# Patient Record
Sex: FEMALE | Race: WHITE | HISPANIC OR LATINO | Employment: STUDENT | URBAN - METROPOLITAN AREA
[De-identification: names, ages, dates, MRNs, and addresses within clinical notes are randomized per-mention and may not be internally consistent; named-entity substitution may affect disease eponyms.]

---
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
## 2022-01-21 DIAGNOSIS — F41.9 ANXIETY AND DEPRESSION: ICD-10-CM

## 2022-01-21 DIAGNOSIS — Z30.011 OCP (ORAL CONTRACEPTIVE PILLS) INITIATION: ICD-10-CM

## 2022-01-21 DIAGNOSIS — F32.A ANXIETY AND DEPRESSION: ICD-10-CM

## 2022-01-21 NOTE — TELEPHONE ENCOUNTER
----- Message from Krystina Oliveros sent at 1/21/2022 11:44 AM CST -----  Regarding: refill  Contact: STEVAN SUAZO [[id number]]  Patient stated she switch to Dr. Garcia. Patient requesting a refill on drospirenone-ethinyl estradioL (KRISTEL) 3-0.03 mg per tablet, and DULoxetine (CYMBALTA) 60 MG capsule.    [phone]    Please call patient at [phone].     Thanks!

## 2022-01-21 NOTE — TELEPHONE ENCOUNTER
----- Message from Krystina Oliveros sent at 1/21/2022 11:44 AM CST -----  Regarding: refill  Contact: STEVAN SUAZO [[id number]]  Patient stated she switch to Dr. Garcia. Patient requesting a refill on drospirenone-ethinyl estradioL (KRISTEL) 3-0.03 mg per tablet, and DULoxetine (CYMBALTA) 60 MG capsule.    [phone]    Please call patient at [phone].     Thanks!

## 2022-01-23 RX ORDER — DULOXETIN HYDROCHLORIDE 60 MG/1
60 CAPSULE, DELAYED RELEASE ORAL DAILY
Qty: 30 CAPSULE | Refills: 5 | Status: SHIPPED | OUTPATIENT
Start: 2022-01-23 | End: 2022-03-30

## 2022-01-23 RX ORDER — DROSPIRENONE AND ETHINYL ESTRADIOL 0.03MG-3MG
1 KIT ORAL DAILY
Qty: 30 TABLET | Refills: 5 | Status: SHIPPED | OUTPATIENT
Start: 2022-01-23 | End: 2022-01-26 | Stop reason: SDUPTHER

## 2022-01-26 ENCOUNTER — OFFICE VISIT (OUTPATIENT)
Dept: FAMILY MEDICINE | Facility: CLINIC | Age: 22
End: 2022-01-26
Payer: OTHER GOVERNMENT

## 2022-01-26 DIAGNOSIS — F32.A ANXIETY AND DEPRESSION: Primary | ICD-10-CM

## 2022-01-26 DIAGNOSIS — Z30.011 OCP (ORAL CONTRACEPTIVE PILLS) INITIATION: ICD-10-CM

## 2022-01-26 DIAGNOSIS — F41.9 ANXIETY AND DEPRESSION: Primary | ICD-10-CM

## 2022-01-26 PROCEDURE — 99214 OFFICE O/P EST MOD 30 MIN: CPT | Mod: 95,,, | Performed by: FAMILY MEDICINE

## 2022-01-26 PROCEDURE — 99214 PR OFFICE/OUTPT VISIT, EST, LEVL IV, 30-39 MIN: ICD-10-PCS | Mod: 95,,, | Performed by: FAMILY MEDICINE

## 2022-01-26 RX ORDER — BUPROPION HYDROCHLORIDE 150 MG/1
150 TABLET ORAL DAILY
Qty: 30 TABLET | Refills: 3 | Status: SHIPPED | OUTPATIENT
Start: 2022-01-26 | End: 2022-02-24

## 2022-01-26 RX ORDER — DROSPIRENONE AND ETHINYL ESTRADIOL 0.03MG-3MG
1 KIT ORAL DAILY
Qty: 30 TABLET | Refills: 5 | Status: SHIPPED | OUTPATIENT
Start: 2022-01-26 | End: 2022-07-21 | Stop reason: SDUPTHER

## 2022-01-26 NOTE — PROGRESS NOTES
[facility] - Clinic Note    Subjective    The patient location is: [facility]  The chief complaint leading to consultation is: refill on birth control    Visit type: audiovisual    Face to Face time with patient: 10  minutes of total time spent on the encounter, which includes face to face time and non-face to face time preparing to see the patient (eg, review of tests), Obtaining and/or reviewing separately obtained history, Documenting clinical information in the electronic or other health record, Independently interpreting results (not separately reported) and communicating results to the patient/family/caregiver, or Care coordination (not separately reported).         Each patient to whom he or she provides medical services by telemedicine is:  (1) informed of the relationship between the physician and patient and the respective role of any other health care provider with respect to management of the patient; and (2) notified that he or she may decline to receive medical services by telemedicine and may withdraw from such care at any time.    Notes:         Ms. Raquel Chino is a 21 y.o. female who presents for a VV.     Needs refill on OCPs.   Compliant.     Had a history of anxiety and depression.   currently on cymbalta. Has not noticed much improvement     [facility] Annmarie has a past medical history of Abnormal eye movements (07/11/2016), COVID-19, Headache, Lazy eye (07/11/2016), Orbital cellulitis on left (07/11/2016), Viral encephalitis, and Viral encephalitis.   [facility] Annmarie has no past surgical history on file.    Annmarie's family history includes Breast cancer in her maternal grandmother; Cancer in her mother; Diabetes in her paternal grandfather and paternal grandmother; Hyperlipidemia in her father; Hypertension in her father.    Annmarie reports that she has never smoked. She has never used smokeless tobacco. She reports that she does not drink alcohol and does not use drugs.   ALG  Annmarie has No Known Allergies.   DEBBIE Anguiano has a current medication list which includes the following prescription(s): bupropion, drospirenone-ethinyl estradiol, duloxetine, and linaclotide.     Review of Systems   Constitutional: Negative for activity change and unexpected weight change.   HENT: Negative for hearing loss and trouble swallowing.    Eyes: Negative for discharge and visual disturbance.   Respiratory: Negative for chest tightness and wheezing.    Cardiovascular: Negative for chest pain and palpitations.   Gastrointestinal: Negative for blood in stool, constipation, diarrhea and vomiting.   Endocrine: Negative for polydipsia and polyuria.   Genitourinary: Negative for difficulty urinating, dysuria, hematuria and menstrual problem.   Musculoskeletal: Negative for arthralgias, joint swelling and neck pain.   Neurological: Negative for weakness and headaches.   Psychiatric/Behavioral: Negative for confusion and dysphoric mood.     Objective     There were no vitals taken for this visit.    Physical Exam   Constitutional:  Non-toxic appearance. She does not appear ill. No distress.   HENT:   Head: Normocephalic and atraumatic.   Right Ear: External ear normal.   Left Ear: External ear normal.   Eyes: Right eye exhibits no discharge. Left eye exhibits no discharge.   Pulmonary/Chest: Effort normal. No respiratory distress.   Speaks in complete sentences without notable SOB. No tachypnea.    Abdominal: Normal appearance.   Neurological: She is alert.   Skin: She is not diaphoretic.   Psychiatric: Her behavior is normal. Mood, judgment and thought content normal.      Assessment/Plan     Diagnoses and all orders for this visit:  -New patient and new problem to me    Anxiety and depression  -     buPROPion (WELLBUTRIN XL) 150 MG TB24 tablet; Take 1 tablet (150 mg total) by mouth once daily.    OCP (oral contraceptive pills) initiation  -     drospirenone-ethinyl estradioL (KRISTEL) 3-0.03 mg per tablet;  Take 1 tablet by mouth once daily.    Follow up in 6 weeks.    Afua Garcia MD  Family Medicine  [facility]

## 2022-02-09 DIAGNOSIS — Z11.59 NEED FOR HEPATITIS C SCREENING TEST: ICD-10-CM

## 2022-03-15 ENCOUNTER — PATIENT MESSAGE (OUTPATIENT)
Dept: FAMILY MEDICINE | Facility: CLINIC | Age: 22
End: 2022-03-15
Payer: OTHER GOVERNMENT

## 2022-03-16 ENCOUNTER — PATIENT MESSAGE (OUTPATIENT)
Dept: ADMINISTRATIVE | Facility: HOSPITAL | Age: 22
End: 2022-03-16
Payer: OTHER GOVERNMENT

## 2022-03-16 ENCOUNTER — PATIENT OUTREACH (OUTPATIENT)
Dept: ADMINISTRATIVE | Facility: HOSPITAL | Age: 22
End: 2022-03-16
Payer: OTHER GOVERNMENT

## 2022-03-30 ENCOUNTER — PATIENT MESSAGE (OUTPATIENT)
Dept: FAMILY MEDICINE | Facility: CLINIC | Age: 22
End: 2022-03-30
Payer: OTHER GOVERNMENT

## 2022-03-30 ENCOUNTER — OFFICE VISIT (OUTPATIENT)
Dept: FAMILY MEDICINE | Facility: CLINIC | Age: 22
End: 2022-03-30
Payer: OTHER GOVERNMENT

## 2022-03-30 DIAGNOSIS — F32.A ANXIETY AND DEPRESSION: Primary | ICD-10-CM

## 2022-03-30 DIAGNOSIS — F41.9 ANXIETY AND DEPRESSION: Primary | ICD-10-CM

## 2022-03-30 DIAGNOSIS — N64.59 BREAST COMPLAINT: Primary | ICD-10-CM

## 2022-03-30 PROCEDURE — 99214 PR OFFICE/OUTPT VISIT, EST, LEVL IV, 30-39 MIN: ICD-10-PCS | Mod: 95,,, | Performed by: FAMILY MEDICINE

## 2022-03-30 PROCEDURE — 99214 OFFICE O/P EST MOD 30 MIN: CPT | Mod: 95,,, | Performed by: FAMILY MEDICINE

## 2022-03-30 RX ORDER — DESVENLAFAXINE SUCCINATE 50 MG/1
50 TABLET, EXTENDED RELEASE ORAL DAILY
Qty: 30 TABLET | Refills: 11 | Status: SHIPPED | OUTPATIENT
Start: 2022-03-30 | End: 2022-06-02

## 2022-03-30 NOTE — PROGRESS NOTES
"[facility] - Clinic Note    Subjective    The patient location is: home  The chief complaint leading to consultation is: follow up     Visit type: audiovisual    Face to Face time with patient: 15 minutes of total time spent on the encounter, which includes face to face time and non-face to face time preparing to see the patient (eg, review of tests), Obtaining and/or reviewing separately obtained history, Documenting clinical information in the electronic or other health record, Independently interpreting results (not separately reported) and communicating results to the patient/family/caregiver, or Care coordination (not separately reported).         Each patient to whom he or she provides medical services by telemedicine is:  (1) informed of the relationship between the physician and patient and the respective role of any other health care provider with respect to management of the patient; and (2) notified that he or she may decline to receive medical services by telemedicine and may withdraw from such care at any time.    Notes:    Ms. Raquel Chino is a 22 y.o. female who presents for a follow up.     Patient seen 2 months ago for anxiety and depression.   She was weaned off cymbalta at that time and started wellbutrin.   She reports that she has not noticed an improvement  Increase anxiety and stress due to school.  She is in college and having a hard time staying focused.     She would like a referral to plastic for breast evaluation. She was told prior that her has extra tissue on one breast than the other as well has a "Tubular nipple"      [facility] Annmarie has a past medical history of Abnormal eye movements (07/11/2016), COVID-19, Headache, Lazy eye (07/11/2016), Orbital cellulitis on left (07/11/2016), Viral encephalitis, and Viral encephalitis.   [facility] Annmarie has no past surgical history on file.    Annmarie's family history includes Breast cancer in her maternal grandmother; Cancer in her " mother; Diabetes in her paternal grandfather and paternal grandmother; Hyperlipidemia in her father; Hypertension in her father.   ERNESTO Anguiano reports that she has never smoked. She has never used smokeless tobacco. She reports that she does not drink alcohol and does not use drugs.   MARK Anguiano has No Known Allergies.   DEBBIE Anguiano has a current medication list which includes the following prescription(s): bupropion, desvenlafaxine succinate, and drospirenone-ethinyl estradiol.     Review of Systems   Constitutional: Negative for activity change and unexpected weight change.   HENT: Negative for hearing loss, rhinorrhea and trouble swallowing.    Eyes: Negative for discharge and visual disturbance.   Respiratory: Negative for chest tightness and wheezing.    Cardiovascular: Negative for chest pain and palpitations.   Gastrointestinal: Negative for blood in stool, constipation, diarrhea and vomiting.   Endocrine: Negative for polydipsia and polyuria.   Genitourinary: Negative for difficulty urinating, dysuria, hematuria and menstrual problem.   Musculoskeletal: Negative for arthralgias, joint swelling and neck pain.   Neurological: Positive for headaches. Negative for weakness.   Psychiatric/Behavioral: Positive for dysphoric mood. Negative for confusion.     Objective     There were no vitals taken for this visit.    Physical Exam   Constitutional:  Non-toxic appearance. She does not appear ill. No distress.   HENT:   Head: Normocephalic and atraumatic.   Right Ear: External ear normal.   Left Ear: External ear normal.   Eyes: Right eye exhibits no discharge. Left eye exhibits no discharge.   Pulmonary/Chest: Effort normal. No respiratory distress.   Speaks in complete sentences without notable SOB. No tachypnea.    Abdominal: Normal appearance.   Neurological: She is alert.   Skin: She is not diaphoretic.   Psychiatric: Her behavior is normal. Mood and thought content normal.      Assessment/Plan      Diagnoses and all orders for this visit:    Anxiety and depression  -     desvenlafaxine succinate (PRISTIQ) 50 MG Tb24; Take 1 tablet (50 mg total) by mouth once daily.    -informed patient to see who is covered under  for plastics and let me know and I will placed the referral accordingly.    Follow up in about 4 weeks (around 4/27/2022).    Afua Garcia MD  Family Medicine  [facility]

## 2022-04-06 ENCOUNTER — PATIENT MESSAGE (OUTPATIENT)
Dept: FAMILY MEDICINE | Facility: CLINIC | Age: 22
End: 2022-04-06
Payer: OTHER GOVERNMENT

## 2022-05-04 ENCOUNTER — OFFICE VISIT (OUTPATIENT)
Dept: URGENT CARE | Facility: CLINIC | Age: 22
End: 2022-05-04
Payer: OTHER GOVERNMENT

## 2022-05-04 VITALS
SYSTOLIC BLOOD PRESSURE: 98 MMHG | TEMPERATURE: 99 F | WEIGHT: 170 LBS | OXYGEN SATURATION: 100 % | BODY MASS INDEX: 25.18 KG/M2 | HEIGHT: 69 IN | RESPIRATION RATE: 16 BRPM | HEART RATE: 95 BPM | DIASTOLIC BLOOD PRESSURE: 57 MMHG

## 2022-05-04 DIAGNOSIS — R07.81 CHEST PAIN, PLEURITIC: ICD-10-CM

## 2022-05-04 DIAGNOSIS — J40 BRONCHITIS: Primary | ICD-10-CM

## 2022-05-04 DIAGNOSIS — R51.9 ACUTE NONINTRACTABLE HEADACHE, UNSPECIFIED HEADACHE TYPE: ICD-10-CM

## 2022-05-04 LAB
CTP QC/QA: YES
SARS-COV-2 RDRP RESP QL NAA+PROBE: NEGATIVE

## 2022-05-04 PROCEDURE — 71046 X-RAY EXAM CHEST 2 VIEWS: CPT | Mod: AQ,S$GLB,, | Performed by: RADIOLOGY

## 2022-05-04 PROCEDURE — 99203 PR OFFICE/OUTPT VISIT, NEW, LEVL III, 30-44 MIN: ICD-10-PCS | Mod: S$GLB,,, | Performed by: PHYSICIAN ASSISTANT

## 2022-05-04 PROCEDURE — 71046 XR CHEST PA AND LATERAL: ICD-10-PCS | Mod: AQ,S$GLB,, | Performed by: RADIOLOGY

## 2022-05-04 PROCEDURE — U0002 COVID-19 LAB TEST NON-CDC: HCPCS | Mod: QW,S$GLB,, | Performed by: PHYSICIAN ASSISTANT

## 2022-05-04 PROCEDURE — U0002: ICD-10-PCS | Mod: QW,S$GLB,, | Performed by: PHYSICIAN ASSISTANT

## 2022-05-04 PROCEDURE — 99203 OFFICE O/P NEW LOW 30 MIN: CPT | Mod: S$GLB,,, | Performed by: PHYSICIAN ASSISTANT

## 2022-05-04 RX ORDER — METHYLPREDNISOLONE 4 MG/1
TABLET ORAL
Qty: 21 EACH | Refills: 0 | Status: SHIPPED | OUTPATIENT
Start: 2022-05-04 | End: 2022-05-25

## 2022-05-04 RX ORDER — PROMETHAZINE HYDROCHLORIDE AND DEXTROMETHORPHAN HYDROBROMIDE 6.25; 15 MG/5ML; MG/5ML
5 SYRUP ORAL EVERY 4 HOURS PRN
Qty: 118 ML | Refills: 0 | Status: SHIPPED | OUTPATIENT
Start: 2022-05-04 | End: 2022-05-14

## 2022-05-04 RX ORDER — BENZONATATE 100 MG/1
100 CAPSULE ORAL 3 TIMES DAILY PRN
Qty: 30 CAPSULE | Refills: 0 | Status: SHIPPED | OUTPATIENT
Start: 2022-05-04 | End: 2022-05-14

## 2022-05-04 NOTE — LETTER
May 4, 2022      [facility]  [address] [place] [address] [place] [address]  Phone: [phone]  Fax: [phone]       Patient: Annmarie Chino   YOB: 2000  Date of Visit: 05/04/2022    To Whom It May Concern:    Mar Chino  was at [facility] on 05/04/2022. The patient may return to work/school on 5/5/22 with no restrictions. If you have any questions or concerns, or if I can be of further assistance, please do not hesitate to contact me.    Sincerely,    Ada Roque PA-C

## 2022-05-04 NOTE — PROGRESS NOTES
"Subjective:       Patient ID: Annmarie Chino is a 22 y.o. female.    Vitals:  height is 5' 9" (1.753 m) and weight is 77.1 kg (170 lb). Her temperature is 99.1 °F (37.3 °C). Her blood pressure is 98/57 (abnormal) and her pulse is 95. Her respiration is 16 and oxygen saturation is 100%.     Chief Complaint: Cough    Productive Cough 2 days     vomiting yesterday         Cough  This is a new problem. The current episode started in the past 7 days. The problem has been unchanged. Associated symptoms include headaches. Pertinent negatives include no chest pain, chills, ear congestion, ear pain, fever, heartburn, hemoptysis, myalgias, nasal congestion, postnasal drip, rash, rhinorrhea, sore throat, shortness of breath, sweats, weight loss or wheezing. She has tried nothing for the symptoms. The treatment provided no relief. There is no history of asthma, bronchiectasis, bronchitis, COPD, emphysema, environmental allergies or pneumonia.       Constitution: Negative for chills and fever.   HENT: Negative for ear pain, postnasal drip and sore throat.    Cardiovascular: Negative for chest pain.   Respiratory: Positive for cough. Negative for bloody sputum, shortness of breath and wheezing.    Gastrointestinal: Negative for heartburn.   Musculoskeletal: Negative for muscle ache.   Skin: Negative for rash.   Allergic/Immunologic: Negative for environmental allergies.   Neurological: Positive for headaches.       Objective:      Physical Exam   Constitutional: She does not appear ill. No distress.   HENT:   Head: Normocephalic.   Ears:   Right Ear: Tympanic membrane and ear canal normal.   Left Ear: Tympanic membrane and ear canal normal.   Nose: Rhinorrhea present. No congestion.   Mouth/Throat: No oropharyngeal exudate or posterior oropharyngeal erythema.   Eyes: Conjunctivae are normal. Pupils are equal, round, and reactive to light.   Cardiovascular: Normal rate and regular rhythm.   Pulmonary/Chest: Effort " normal and breath sounds normal. No stridor. No respiratory distress. She has no wheezes. She has no rhonchi. She has no rales.   Abdominal: Normal appearance. She exhibits no distension. There is no abdominal tenderness.   Neurological: She is alert.   Nursing note and vitals reviewed.        Assessment:       1. Bronchitis    2. Chest pain, pleuritic    3. Acute nonintractable headache, unspecified headache type        Here with cough and pain in deep inspiration. She reports increased stress due to finals week. She denies fever, chills, or recent illness. Chest xray was performed and was negative for signs of acute infection. She was tested for covid which was also negative. She was instructed to rest and prescribed a steroid dose pack for her bronchitis. I also prescribed tessalon and promethazine for cough. She was instructed to hydrate and return to the clinic if new or worsening symptoms occur.    Plan:         Bronchitis  -     methylPREDNISolone (MEDROL DOSEPACK) 4 mg tablet; use as directed  Dispense: 21 each; Refill: 0  -     benzonatate (TESSALON) 100 MG capsule; Take 1 capsule (100 mg total) by mouth 3 (three) times daily as needed for Cough.  Dispense: 30 capsule; Refill: 0  -     promethazine-dextromethorphan (PROMETHAZINE-DM) 6.25-15 mg/5 mL Syrp; Take 5 mLs by mouth every 4 (four) hours as needed.  Dispense: 118 mL; Refill: 0    Chest pain, pleuritic  -     XR CHEST PA AND LATERAL; Future; Expected date: 05/04/2022    Acute nonintractable headache, unspecified headache type  -     POCT COVID-19 Rapid Screening

## 2022-06-02 ENCOUNTER — OFFICE VISIT (OUTPATIENT)
Dept: FAMILY MEDICINE | Facility: CLINIC | Age: 22
End: 2022-06-02
Payer: OTHER GOVERNMENT

## 2022-06-02 DIAGNOSIS — F41.9 ANXIETY: Primary | ICD-10-CM

## 2022-06-02 DIAGNOSIS — R41.840 CONCENTRATION DEFICIT: ICD-10-CM

## 2022-06-02 DIAGNOSIS — N64.89 DEVELOPMENTAL BREAST ASYMMETRY: ICD-10-CM

## 2022-06-02 PROCEDURE — 99442 PR PHYSICIAN TELEPHONE EVALUATION 11-20 MIN: ICD-10-PCS | Mod: 95,,, | Performed by: FAMILY MEDICINE

## 2022-06-02 PROCEDURE — 99442 PR PHYSICIAN TELEPHONE EVALUATION 11-20 MIN: CPT | Mod: 95,,, | Performed by: FAMILY MEDICINE

## 2022-06-02 RX ORDER — DULOXETIN HYDROCHLORIDE 60 MG/1
CAPSULE, DELAYED RELEASE ORAL
COMMUNITY
Start: 2021-12-27 | End: 2022-06-02 | Stop reason: ALTCHOICE

## 2022-06-02 RX ORDER — AZELASTINE 1 MG/ML
SPRAY, METERED NASAL
COMMUNITY
Start: 2022-04-18 | End: 2022-11-04

## 2022-06-02 NOTE — Clinical Note
Please fax/process psychiatry referral (Dr. Jose Hickey, [place]). Patient has . Also, she'll need a follow-up with me in about 2 months. Thanks

## 2022-06-09 NOTE — PROGRESS NOTES
The patient location is: home  The chief complaint leading to consultation is: establish care    Visit type: audiovisual    Face to Face time with patient: 18 min  19 minutes of total time spent on the encounter, which includes face to face time and non-face to face time preparing to see the patient (eg, review of tests), Obtaining and/or reviewing separately obtained history, Documenting clinical information in the electronic or other health record, Independently interpreting results (not separately reported) and communicating results to the patient/family/caregiver, or Care coordination (not separately reported).         Each patient to whom he or she provides medical services by telemedicine is:  (1) informed of the relationship between the physician and patient and the respective role of any other health care provider with respect to management of the patient; and (2) notified that he or she may decline to receive medical services by telemedicine and may withdraw from such care at any time.    Notes: Patient presents to [facility]. She is new to me but not to [facility]. Medical, surgical, and social histories reviewed. Has had issues with anxiety, difficulty concentration, and depressed mood off and on for over a year. No relief with meds tried thus far (pristiq, wellbutrin, cymbalta). Not currently taking anything for this but reports symptoms are minimal at this time. Is amenable to seeing a psychiatrist to discuss these issues and do formal testing.    Answers for HPI/ROS submitted by the patient on 6/1/2022  activity change: No  unexpected weight change: No  neck pain: No  hearing loss: No  rhinorrhea: No  trouble swallowing: No  eye discharge: No  visual disturbance: No  chest tightness: No  wheezing: No  chest pain: No  palpitations: No  blood in stool: No  constipation: No  vomiting: No  diarrhea: No  polydipsia: No  polyuria: No  difficulty urinating: No  hematuria: No  menstrual problem: No  dysuria:  No  joint swelling: No  arthralgias: No  headaches: No  weakness: No  confusion: No  dysphoric mood: No    Physical Exam  Constitutional:       General: She is not in acute distress.     Appearance: She is not toxic-appearing.   Pulmonary:      Effort: Pulmonary effort is normal. No respiratory distress.   Neurological:      Mental Status: She is alert and oriented to person, place, and time.   Psychiatric:         Mood and Affect: Mood normal.         Behavior: Behavior normal.     Annmarie was seen today for establish care.    Diagnoses and all orders for this visit:    Anxiety  -     Ambulatory referral/consult to Psychiatry; Future    Concentration deficit  -     Ambulatory referral/consult to Psychiatry; Future    Developmental breast asymmetry  - patient to check with insurance company to see who is in her network

## 2022-07-06 ENCOUNTER — TELEPHONE (OUTPATIENT)
Dept: FAMILY MEDICINE | Facility: CLINIC | Age: 22
End: 2022-07-06
Payer: OTHER GOVERNMENT

## 2022-08-13 ENCOUNTER — OFFICE VISIT (OUTPATIENT)
Dept: URGENT CARE | Facility: CLINIC | Age: 22
End: 2022-08-13
Payer: OTHER GOVERNMENT

## 2022-08-13 VITALS
WEIGHT: 170 LBS | HEIGHT: 69 IN | OXYGEN SATURATION: 97 % | DIASTOLIC BLOOD PRESSURE: 59 MMHG | HEART RATE: 78 BPM | SYSTOLIC BLOOD PRESSURE: 108 MMHG | TEMPERATURE: 98 F | BODY MASS INDEX: 25.18 KG/M2 | RESPIRATION RATE: 16 BRPM

## 2022-08-13 DIAGNOSIS — H70.92 MASTOIDITIS OF LEFT SIDE: ICD-10-CM

## 2022-08-13 DIAGNOSIS — H92.02 OTALGIA, LEFT: ICD-10-CM

## 2022-08-13 DIAGNOSIS — H60.502 ACUTE OTITIS EXTERNA OF LEFT EAR, UNSPECIFIED TYPE: Primary | ICD-10-CM

## 2022-08-13 PROCEDURE — 99213 OFFICE O/P EST LOW 20 MIN: CPT | Mod: S$GLB,,, | Performed by: NURSE PRACTITIONER

## 2022-08-13 PROCEDURE — 99213 PR OFFICE/OUTPT VISIT, EST, LEVL III, 20-29 MIN: ICD-10-PCS | Mod: S$GLB,,, | Performed by: NURSE PRACTITIONER

## 2022-08-13 RX ORDER — OFLOXACIN 3 MG/ML
10 SOLUTION AURICULAR (OTIC) DAILY
Qty: 4.67 ML | Refills: 0 | Status: SHIPPED | OUTPATIENT
Start: 2022-08-13 | End: 2022-08-20

## 2022-08-13 RX ORDER — CEFDINIR 300 MG/1
300 CAPSULE ORAL 2 TIMES DAILY
Qty: 14 CAPSULE | Refills: 0 | Status: SHIPPED | OUTPATIENT
Start: 2022-08-13 | End: 2022-08-20

## 2022-08-13 RX ORDER — IBUPROFEN 800 MG/1
800 TABLET ORAL EVERY 8 HOURS PRN
Qty: 30 TABLET | Refills: 0 | Status: SHIPPED | OUTPATIENT
Start: 2022-08-13 | End: 2022-08-23

## 2022-08-13 NOTE — PROGRESS NOTES
"Subjective:       Patient ID: Annmarie Chino is a 22 y.o. female.    Vitals:  height is 5' 9" (1.753 m) and weight is 77.1 kg (170 lb). Her tympanic temperature is 98.3 °F (36.8 °C). Her blood pressure is 108/59 (abnormal) and her pulse is 78. Her respiration is 16 and oxygen saturation is 97%.     Chief Complaint: Otalgia (Left ear)    Otalgia   There is pain in the left ear. This is a new problem. The current episode started yesterday. The problem occurs constantly. The problem has been gradually worsening. There has been no fever. The pain is at a severity of 5/10. The pain is moderate. She has tried nothing for the symptoms. The treatment provided no relief.       HENT: Positive for ear pain.        Objective:      Physical Exam   Constitutional: She is oriented to person, place, and time. She appears well-developed. She is cooperative.  Non-toxic appearance. She does not appear ill. No distress.   HENT:   Head: Normocephalic and atraumatic.       Ears:   Right Ear: Hearing, external ear and ear canal normal. A middle ear effusion is present.   Left Ear: Hearing and external ear normal. There is swelling and tenderness. A middle ear effusion is present.   Nose: Nose normal. No mucosal edema, rhinorrhea or nasal deformity. No epistaxis. Right sinus exhibits no maxillary sinus tenderness and no frontal sinus tenderness. Left sinus exhibits no maxillary sinus tenderness and no frontal sinus tenderness.   Mouth/Throat: Mucous membranes are normal.   Erythematous area of swelling to left ear canal. No drainage noted.      Comments: Erythematous area of swelling to left ear canal. No drainage noted.  Eyes: Conjunctivae and lids are normal. No scleral icterus.   Neck: Trachea normal and phonation normal. Neck supple. No edema present. No erythema present. No neck rigidity present.   Cardiovascular: Normal rate, regular rhythm, normal heart sounds and normal pulses.   Pulmonary/Chest: Effort normal and breath " sounds normal. No respiratory distress. She has no decreased breath sounds. She has no wheezes. She has no rhonchi.   Abdominal: Normal appearance.   Musculoskeletal: Normal range of motion.         General: No deformity. Normal range of motion.   Neurological: She is alert and oriented to person, place, and time. She exhibits normal muscle tone. Coordination normal.   Skin: Skin is warm, dry, intact, not diaphoretic and not pale.   Psychiatric: Her speech is normal and behavior is normal. Judgment and thought content normal.   Nursing note and vitals reviewed.        Assessment:       1. Acute otitis externa of left ear, unspecified type    2. Otalgia, left    3. Mastoiditis of left side          Plan:         Acute otitis externa of left ear, unspecified type  -     ofloxacin (FLOXIN) 0.3 % otic solution; Place 10 drops into the left ear once daily. for 7 days  Dispense: 4.67 mL; Refill: 0  -     cefdinir (OMNICEF) 300 MG capsule; Take 1 capsule (300 mg total) by mouth 2 (two) times daily. for 7 days  Dispense: 14 capsule; Refill: 0    Otalgia, left  -     ibuprofen (ADVIL,MOTRIN) 800 MG tablet; Take 1 tablet (800 mg total) by mouth every 8 (eight) hours as needed for Pain.  Dispense: 30 tablet; Refill: 0    Mastoiditis of left side  -     cefdinir (OMNICEF) 300 MG capsule; Take 1 capsule (300 mg total) by mouth 2 (two) times daily. for 7 days  Dispense: 14 capsule; Refill: 0                 Instill antibiotic drops as prescribed.  Avoid getting water into ear.  Take ibuprofen as prescribed for pain.  Complete antibiotics as prescribed by mouth.  Follow up with PCP or ENT if symptoms persist or worsen.

## 2022-08-16 ENCOUNTER — TELEPHONE (OUTPATIENT)
Dept: URGENT CARE | Facility: CLINIC | Age: 22
End: 2022-08-16
Payer: OTHER GOVERNMENT

## 2022-11-03 ENCOUNTER — OFFICE VISIT (OUTPATIENT)
Dept: URGENT CARE | Facility: CLINIC | Age: 22
End: 2022-11-03
Payer: OTHER GOVERNMENT

## 2022-11-03 VITALS
TEMPERATURE: 99 F | WEIGHT: 170 LBS | OXYGEN SATURATION: 100 % | HEIGHT: 69 IN | BODY MASS INDEX: 25.18 KG/M2 | DIASTOLIC BLOOD PRESSURE: 52 MMHG | SYSTOLIC BLOOD PRESSURE: 107 MMHG | RESPIRATION RATE: 12 BRPM | HEART RATE: 76 BPM

## 2022-11-03 DIAGNOSIS — Z20.828 EXPOSURE TO THE FLU: ICD-10-CM

## 2022-11-03 DIAGNOSIS — J06.9 VIRAL UPPER RESPIRATORY TRACT INFECTION: Primary | ICD-10-CM

## 2022-11-03 DIAGNOSIS — R52 BODY ACHES: ICD-10-CM

## 2022-11-03 DIAGNOSIS — J02.9 SORE THROAT: ICD-10-CM

## 2022-11-03 DIAGNOSIS — R05.9 COUGH, UNSPECIFIED TYPE: ICD-10-CM

## 2022-11-03 DIAGNOSIS — R68.83 CHILLS: ICD-10-CM

## 2022-11-03 LAB
CTP QC/QA: YES
CTP QC/QA: YES
POC MOLECULAR INFLUENZA A AGN: NEGATIVE
POC MOLECULAR INFLUENZA B AGN: NEGATIVE
SARS-COV-2 RDRP RESP QL NAA+PROBE: NEGATIVE

## 2022-11-03 PROCEDURE — 87635 SARS-COV-2 COVID-19 AMP PRB: CPT | Mod: QW,S$GLB,,

## 2022-11-03 PROCEDURE — 99213 PR OFFICE/OUTPT VISIT, EST, LEVL III, 20-29 MIN: ICD-10-PCS | Mod: S$GLB,,,

## 2022-11-03 PROCEDURE — 87502 INFLUENZA DNA AMP PROBE: CPT | Mod: QW,S$GLB,,

## 2022-11-03 PROCEDURE — 87635: ICD-10-PCS | Mod: QW,S$GLB,,

## 2022-11-03 PROCEDURE — 99213 OFFICE O/P EST LOW 20 MIN: CPT | Mod: S$GLB,,,

## 2022-11-03 PROCEDURE — 87502 POCT INFLUENZA A/B MOLECULAR: ICD-10-PCS | Mod: QW,S$GLB,,

## 2022-11-03 RX ORDER — RIZATRIPTAN BENZOATE 5 MG/1
5 TABLET ORAL
COMMUNITY
End: 2023-03-01 | Stop reason: SDUPTHER

## 2022-11-03 RX ORDER — BENZONATATE 200 MG/1
200 CAPSULE ORAL 3 TIMES DAILY PRN
Qty: 30 CAPSULE | Refills: 0 | Status: SHIPPED | OUTPATIENT
Start: 2022-11-03 | End: 2022-12-23

## 2022-11-03 NOTE — LETTER
November 3, 2022      [facility]  [address] [place] [address] [place] [address]  Phone: [phone]  Fax: [phone]       Patient: Annmarie Chino   YOB: 2000  Date of Visit: 11/03/2022    To Whom It May Concern:    Mar Chino  was at [facility] on 11/03/2022. The patient may return to work/school on 11/7/2022 with no restrictions. If you have any questions or concerns, or if I can be of further assistance, please do not hesitate to contact me.    Sincerely,    Elaine Nichole PA-C

## 2022-11-03 NOTE — PROGRESS NOTES
"Subjective:       Patient ID: Annmarie Cihno is a 22 y.o. female.    Vitals:  height is 5' 9" (1.753 m) and weight is 77.1 kg (170 lb). Her temperature is 98.7 °F (37.1 °C). Her blood pressure is 107/52 (abnormal) and her pulse is 76. Her respiration is 12 and oxygen saturation is 100%.     Chief Complaint: Generalized Body Aches    Annmarie Chino is a 22 y.o. female who presents for URI sxs which onset 2 days ago. Associated sxs include fatigue, sore throat, generalized myalgias, chills, and congestion. Patient denies any fever, SOB, CP, abd pain, n/v/d, rash, dizziness, or numbness/tingling. (+) flu exposure. She is vaccinated. Prior Tx includes chloraseptic with relief.      Other  This is a new problem. The current episode started in the past 7 days (Onset two days ago). The problem occurs constantly. The problem has been gradually improving. Associated symptoms include chills (sweats and chills), congestion (chest congestion), coughing, fatigue, myalgias and a sore throat (improved). Pertinent negatives include no abdominal pain, change in bowel habit, chest pain (chest heaviness), diaphoresis, fever, headaches, nausea, numbness, swollen glands or vomiting. Nothing aggravates the symptoms. Treatments tried: Chloraseptic. The treatment provided moderate relief.     Constitution: Positive for chills (sweats and chills) and fatigue. Negative for appetite change, sweating and fever.   HENT:  Positive for congestion (chest congestion) and sore throat (improved). Negative for ear pain, ear discharge, hearing loss, postnasal drip, sinus pain, sinus pressure and trouble swallowing.    Cardiovascular:  Negative for chest pain (chest heaviness).   Respiratory:  Positive for cough. Negative for sputum production and shortness of breath.    Gastrointestinal:  Negative for abdominal pain, nausea, vomiting and diarrhea.   Musculoskeletal:  Positive for muscle ache.   Neurological:  Negative for " dizziness, headaches, numbness and tingling.     Objective:      Physical Exam   Constitutional: She is oriented to person, place, and time. She appears well-developed. She is cooperative.  Non-toxic appearance. She does not appear ill. No distress.   HENT:   Head: Normocephalic and atraumatic.   Ears:   Right Ear: Hearing, tympanic membrane, external ear and ear canal normal.   Left Ear: Hearing, tympanic membrane, external ear and ear canal normal.   Nose: Nose normal. No mucosal edema or nasal deformity. No epistaxis. Right sinus exhibits no maxillary sinus tenderness and no frontal sinus tenderness. Left sinus exhibits no maxillary sinus tenderness and no frontal sinus tenderness.   Mouth/Throat: Uvula is midline, oropharynx is clear and moist and mucous membranes are normal. Mucous membranes are moist. No trismus in the jaw. Normal dentition. No uvula swelling. No oropharyngeal exudate, posterior oropharyngeal edema or posterior oropharyngeal erythema.   Eyes: Conjunctivae and lids are normal. No scleral icterus. Extraocular movement intact   Neck: Trachea normal and phonation normal. Neck supple. No edema present. No erythema present. No neck rigidity present.   Cardiovascular: Normal rate, regular rhythm, normal heart sounds and normal pulses.   Pulmonary/Chest: Effort normal and breath sounds normal. No stridor. No respiratory distress. She has no decreased breath sounds. She has no wheezes. She has no rhonchi. She has no rales.   Abdominal: Normal appearance.   Musculoskeletal: Normal range of motion.         General: No deformity. Normal range of motion.   Neurological: She is alert and oriented to person, place, and time. She exhibits normal muscle tone. Coordination normal.   Skin: Skin is warm, dry, intact, not diaphoretic and not pale.   Psychiatric: Her speech is normal and behavior is normal. Judgment and thought content normal.   Nursing note and vitals reviewed.      Assessment:       1. Viral  upper respiratory tract infection    2. Exposure to the flu    3. Chills    4. Sore throat    5. Body aches    6. Cough, unspecified type          Results for orders placed or performed in visit on 11/03/22   POCT Influenza A/B MOLECULAR   Result Value Ref Range    POC Molecular Influenza A Ag Negative Negative, Not Reported    POC Molecular Influenza B Ag Negative Negative, Not Reported     Acceptable Yes    POCT COVID-19 Rapid Screening   Result Value Ref Range    POC Rapid COVID Negative Negative     Acceptable Yes        Plan:         Viral upper respiratory tract infection    Exposure to the flu  -     POCT Influenza A/B MOLECULAR    Chills  -     POCT Influenza A/B MOLECULAR  -     POCT COVID-19 Rapid Screening    Sore throat  -     POCT Influenza A/B MOLECULAR    Body aches  -     POCT Influenza A/B MOLECULAR    Cough, unspecified type  -     benzonatate (TESSALON) 200 MG capsule; Take 1 capsule (200 mg total) by mouth 3 (three) times daily as needed for Cough.  Dispense: 30 capsule; Refill: 0    Discussed negative results with patient. Patient verbalized understanding. Educated patient on viral vs bacterial sinus infection/upper respiratory infection. Advised patient to begin OTC zyrtec with decongestant, flonase, normal saline nasal spray and OTC cough suppressants for symptom relief. If symptoms do not resolve, return to clinic for further evaluation. Patient vitals stable. Patient in no acute respiratory distress. Discussed discharge instructions with patient, he has no further questions at this time. Patient exits exam room in no distress.     Discussed with patient all pertinent information and results. Discussed patient diagnosis and plan of treatment. Patient was given all follow up and return instructions. All questions and concerns were addressed at this time. Patient expresses understanding of information and instructions, and is comfortable with plan.    Patient was  instructed to return to clinic or go to ED immediately for any worsening or change in current symptoms.    Patient Instructions   PLEASE READ YOUR DISCHARGE INSTRUCTIONS ENTIRELY AS IT CONTAINS IMPORTANT INFORMATION.    Please drink plenty of fluids.    Please get plenty of rest.    Please return here or go to the Emergency Department for any concerns or worsening of condition.    Please take an over the counter antihistamine medication (Allegra/Claritin/Zyrtec) of your choice as directed for allergy symptoms and/or runny nose and postnasal drip.    Try an over the counter decongestant for sinus pressure/ear pressure, congestion symptoms like Mucinex D or Sudafed or Phenylephrine. You buy this behind the pharmacy counter.    If you do have Hypertension or palpitations, it is safe to take Coricidin HBP for relief of sinus symptoms.    Tylenol or ibuprofen can also be used as directed for pain and fever unless you have an allergy to them or medical condition such as stomach ulcers, kidney or liver disease or blood thinners etc for which you should not be taking these type of medications.     Sore throat recommendations: Warm fluids, warm salt water gargles, throat lozenges, tea, honey, soup, rest, hydration.    Use over the counter Flonase or Nasocort: one spray each nostril twice daily OR two sprays each nostril once daily until nares dry out, unless you have Glaucoma.   Can also supplement with nasal saline rinse.    Sinus rinses DO NOT USE TAP WATER, if you must, water must be at a rolling boil for 1 minute, let it cool, then use.  May use distilled water, or over the counter nasal saline rinses.  Eb's vapor rub in shower to help open nasal passages.  May use nasal gel to keep passages moisturized.  May use nasal saline sprays during the day for added relief of congestion.   For those who go to the gym, please do not use the sauna or steam room now to clear sinuses.    Cough     Rest and fluids are important  Can  use honey with sunny to soothe your throat    Robitussin or Delsyum for cough suppressant for dry cough.    Mucinex DM or products containing Guaifenesin or Dextromethorphan for expectorant (wet cough).    Take prescription cough meds (pills) as prescribed; take prescription cough syrup at night as needed for cough.  Do not take both the prescribed cough pills and syrup at the same time or within 6 hours of each other.  Do not take the cough syrup with any other sedative medication as it can can cause drowsiness. Do not operate any heavy machinery, drink or drive while taking the cough syrup.     Please follow up with your primary care doctor or specialist in the next 48-72hrs as needed and if no improvement    If you smoke, please stop smoking.    Please return or see your primary care doctor if you develop new or worsening symptoms.     Please arrange follow up with your primary medical clinic as soon as possible. You must understand that you've received an Urgent Care treatment only and that you may be released before all of your medical problems are known or treated. You, the patient, will arrange for follow up as instructed. If your symptoms worsen or fail to improve you should go to the Emergency Room.

## 2022-11-03 NOTE — PATIENT INSTRUCTIONS
PLEASE READ YOUR DISCHARGE INSTRUCTIONS ENTIRELY AS IT CONTAINS IMPORTANT INFORMATION.    Please drink plenty of fluids.    Please get plenty of rest.    Please return here or go to the Emergency Department for any concerns or worsening of condition.    Please take an over the counter antihistamine medication (Allegra/Claritin/Zyrtec) of your choice as directed for allergy symptoms and/or runny nose and postnasal drip.    Try an over the counter decongestant for sinus pressure/ear pressure, congestion symptoms like Mucinex D or Sudafed or Phenylephrine. You buy this behind the pharmacy counter.    If you do have Hypertension or palpitations, it is safe to take Coricidin HBP for relief of sinus symptoms.    Tylenol or ibuprofen can also be used as directed for pain and fever unless you have an allergy to them or medical condition such as stomach ulcers, kidney or liver disease or blood thinners etc for which you should not be taking these type of medications.     Sore throat recommendations: Warm fluids, warm salt water gargles, throat lozenges, tea, honey, soup, rest, hydration.    Use over the counter Flonase or Nasocort: one spray each nostril twice daily OR two sprays each nostril once daily until nares dry out, unless you have Glaucoma.   Can also supplement with nasal saline rinse.    Sinus rinses DO NOT USE TAP WATER, if you must, water must be at a rolling boil for 1 minute, let it cool, then use.  May use distilled water, or over the counter nasal saline rinses.  Eb's vapor rub in shower to help open nasal passages.  May use nasal gel to keep passages moisturized.  May use nasal saline sprays during the day for added relief of congestion.   For those who go to the gym, please do not use the sauna or steam room now to clear sinuses.    Cough     Rest and fluids are important  Can use honey with sunny to soothe your throat    Robitussin or Delsyum for cough suppressant for dry cough.    Mucinex DM or  products containing Guaifenesin or Dextromethorphan for expectorant (wet cough).    Take prescription cough meds (pills) as prescribed; take prescription cough syrup at night as needed for cough.  Do not take both the prescribed cough pills and syrup at the same time or within 6 hours of each other.  Do not take the cough syrup with any other sedative medication as it can can cause drowsiness. Do not operate any heavy machinery, drink or drive while taking the cough syrup.     Please follow up with your primary care doctor or specialist in the next 48-72hrs as needed and if no improvement    If you smoke, please stop smoking.    Please return or see your primary care doctor if you develop new or worsening symptoms.     Please arrange follow up with your primary medical clinic as soon as possible. You must understand that you've received an Urgent Care treatment only and that you may be released before all of your medical problems are known or treated. You, the patient, will arrange for follow up as instructed. If your symptoms worsen or fail to improve you should go to the Emergency Room.

## 2022-11-04 ENCOUNTER — OFFICE VISIT (OUTPATIENT)
Dept: FAMILY MEDICINE | Facility: CLINIC | Age: 22
End: 2022-11-04
Payer: OTHER GOVERNMENT

## 2022-11-04 DIAGNOSIS — F41.9 ANXIETY: Primary | ICD-10-CM

## 2022-11-04 DIAGNOSIS — F32.A ANXIETY AND DEPRESSION: ICD-10-CM

## 2022-11-04 DIAGNOSIS — F41.9 ANXIETY AND DEPRESSION: ICD-10-CM

## 2022-11-04 PROCEDURE — 99214 PR OFFICE/OUTPT VISIT, EST, LEVL IV, 30-39 MIN: ICD-10-PCS | Mod: 95,,, | Performed by: FAMILY MEDICINE

## 2022-11-04 PROCEDURE — 99214 OFFICE O/P EST MOD 30 MIN: CPT | Mod: 95,,, | Performed by: FAMILY MEDICINE

## 2022-11-04 RX ORDER — BUSPIRONE HYDROCHLORIDE 5 MG/1
5 TABLET ORAL 2 TIMES DAILY PRN
Qty: 60 TABLET | Refills: 11 | Status: SHIPPED | OUTPATIENT
Start: 2022-11-04 | End: 2023-08-10

## 2022-11-04 RX ORDER — SERTRALINE HYDROCHLORIDE 25 MG/1
25 TABLET, FILM COATED ORAL DAILY
Qty: 30 TABLET | Refills: 11 | Status: SHIPPED | OUTPATIENT
Start: 2022-11-04 | End: 2022-11-30

## 2022-11-04 NOTE — PROGRESS NOTES
The patient location is: [place], [place]  The chief complaint leading to consultation is: Establish care    Visit type: audiovisual    Face to Face time with patient: 15  17 minutes of total time spent on the encounter, which includes face to face time and non-face to face time preparing to see the patient (eg, review of tests), Obtaining and/or reviewing separately obtained history, Documenting clinical information in the electronic or other health record, Independently interpreting results (not separately reported) and communicating results to the patient/family/caregiver, or Care coordination (not separately reported).         Each patient to whom he or she provides medical services by telemedicine is:  (1) informed of the relationship between the physician and patient and the respective role of any other health care provider with respect to management of the patient; and (2) notified that he or she may decline to receive medical services by telemedicine and may withdraw from such care at any time.    Notes:  Subjective:       Patient ID: Annmarie Chino is a 22 y.o. female.    Chief Complaint: No chief complaint on file.      Montserrat presents today to change primary care providers.   Previous patient of Dr. Johns.     Migraine: Takes maxalt as needed. 3 times/month  Cannot get out of bed/light and sound sensitivity.   Trigger- smell, seasons, maybe stress    Increased stress.   2 jobs.  Ending college.  Still has anxiety and depression and treats it by burying herself in work and school.     Last PCP- tried referral to psych.   Took wellbutrin for a little bit. Thought ti would be great and thought it would help her focus.   Her mood and everything was still the same after a few months and then she stopped.     Review of Systems   Constitutional:  Negative for activity change and unexpected weight change.   HENT:  Negative for hearing loss, rhinorrhea and trouble swallowing.    Eyes:  Negative for  discharge and visual disturbance.   Respiratory:  Negative for chest tightness and wheezing.    Cardiovascular:  Negative for chest pain and palpitations.   Gastrointestinal:  Negative for blood in stool, constipation, diarrhea and vomiting.   Endocrine: Negative for polydipsia and polyuria.   Genitourinary:  Negative for difficulty urinating, dysuria, hematuria and menstrual problem.   Musculoskeletal:  Negative for arthralgias, joint swelling and neck pain.   Neurological:  Positive for headaches. Negative for weakness.   Psychiatric/Behavioral:  Positive for dysphoric mood. Negative for confusion.        Objective:      Physical Exam    Assessment:       1. Anxiety    2. Anxiety and depression        Plan:       Problem List Items Addressed This Visit    None  Visit Diagnoses       Anxiety    -  Primary    Relevant Medications    sertraline (ZOLOFT) 25 MG tablet    busPIRone (BUSPAR) 5 MG Tab    Anxiety and depression        Relevant Medications    sertraline (ZOLOFT) 25 MG tablet           New conditions.   New rx.   Close follow up.        50

## 2022-11-08 ENCOUNTER — OFFICE VISIT (OUTPATIENT)
Dept: URGENT CARE | Facility: CLINIC | Age: 22
End: 2022-11-08
Payer: OTHER GOVERNMENT

## 2022-11-08 VITALS
BODY MASS INDEX: 25.18 KG/M2 | HEIGHT: 69 IN | OXYGEN SATURATION: 97 % | TEMPERATURE: 99 F | HEART RATE: 77 BPM | WEIGHT: 170 LBS | SYSTOLIC BLOOD PRESSURE: 91 MMHG | RESPIRATION RATE: 16 BRPM | DIASTOLIC BLOOD PRESSURE: 56 MMHG

## 2022-11-08 DIAGNOSIS — J40 BRONCHITIS: Primary | ICD-10-CM

## 2022-11-08 PROCEDURE — 99213 OFFICE O/P EST LOW 20 MIN: CPT | Mod: S$GLB,,, | Performed by: PHYSICIAN ASSISTANT

## 2022-11-08 PROCEDURE — 99213 PR OFFICE/OUTPT VISIT, EST, LEVL III, 20-29 MIN: ICD-10-PCS | Mod: S$GLB,,, | Performed by: PHYSICIAN ASSISTANT

## 2022-11-08 RX ORDER — PREDNISONE 10 MG/1
10 TABLET ORAL DAILY
Qty: 5 TABLET | Refills: 0 | Status: SHIPPED | OUTPATIENT
Start: 2022-11-08 | End: 2022-11-13

## 2022-11-08 RX ORDER — BENZONATATE 100 MG/1
200 CAPSULE ORAL 3 TIMES DAILY PRN
Qty: 60 CAPSULE | Refills: 0 | Status: SHIPPED | OUTPATIENT
Start: 2022-11-08 | End: 2022-12-23

## 2022-11-08 RX ORDER — CETIRIZINE HYDROCHLORIDE 10 MG/1
10 TABLET ORAL DAILY
Qty: 30 TABLET | Refills: 0 | Status: SHIPPED | OUTPATIENT
Start: 2022-11-08 | End: 2022-12-23

## 2022-11-08 RX ORDER — PROMETHAZINE HYDROCHLORIDE AND DEXTROMETHORPHAN HYDROBROMIDE 6.25; 15 MG/5ML; MG/5ML
5 SYRUP ORAL NIGHTLY PRN
Qty: 50 ML | Refills: 0 | Status: SHIPPED | OUTPATIENT
Start: 2022-11-08 | End: 2022-11-18

## 2022-11-08 RX ORDER — FLUTICASONE PROPIONATE 50 MCG
1 SPRAY, SUSPENSION (ML) NASAL DAILY
Qty: 11.1 ML | Refills: 0 | Status: SHIPPED | OUTPATIENT
Start: 2022-11-08 | End: 2022-12-23

## 2022-11-08 NOTE — PROGRESS NOTES
"Subjective:       Patient ID: Annmarie Chino is a 22 y.o. female.    Vitals:  height is 5' 9" (1.753 m) and weight is 77.1 kg (170 lb). Her tympanic temperature is 98.7 °F (37.1 °C). Her blood pressure is 91/56 (abnormal) and her pulse is 77. Her respiration is 16 and oxygen saturation is 97%.     Chief Complaint: Cough     Patient is a 22yr old female patient presented here today w/cough (green mucus), congestion, cp, sore throat. Pt denies fever, NV. Pt states that she was seen on 11/03 @ [facility] & sx has not improve. Pt states that she's tried otc Sudafed w/no improvement.  Patient states her chest hurts with coughing.  Patient states she has not had any fevers or chills.  Patient denies any known sick contacts.  Patient is otherwise well.      Cough  This is a new problem. The current episode started 1 to 4 weeks ago. The problem has been unchanged. The problem occurs constantly. The cough is Productive of sputum. Associated symptoms include chest pain (With cough.), myalgias, nasal congestion, postnasal drip, rhinorrhea and a sore throat. Pertinent negatives include no chills, ear congestion, ear pain, fever, headaches, heartburn, hemoptysis, rash, shortness of breath, sweats, weight loss or wheezing. She has tried OTC cough suppressant for the symptoms. The treatment provided mild relief. Her past medical history is significant for environmental allergies. There is no history of asthma or bronchitis.     Constitution: Negative for chills and fever.   HENT:  Positive for postnasal drip and sore throat. Negative for ear pain, congestion, sinus pain, sinus pressure and trouble swallowing.    Neck: Negative for painful lymph nodes.   Cardiovascular:  Positive for chest pain (With cough.).   Respiratory:  Positive for cough and sputum production. Negative for bloody sputum, shortness of breath and wheezing.    Gastrointestinal:  Negative for abdominal pain, nausea, vomiting, diarrhea and heartburn. "   Musculoskeletal:  Positive for muscle ache.   Skin:  Negative for rash.   Allergic/Immunologic: Positive for environmental allergies.   Neurological:  Negative for headaches.   Hematologic/Lymphatic: Negative for swollen lymph nodes.     Objective:      Physical Exam   Constitutional: She is oriented to person, place, and time. She appears well-developed. She is cooperative.  Non-toxic appearance. She does not appear ill. No distress.   HENT:   Head: Normocephalic and atraumatic.   Ears:   Right Ear: Hearing, tympanic membrane, external ear and ear canal normal.   Left Ear: Hearing, tympanic membrane, external ear and ear canal normal.   Nose: Congestion present. No mucosal edema, rhinorrhea or nasal deformity. No epistaxis. Right sinus exhibits no maxillary sinus tenderness and no frontal sinus tenderness. Left sinus exhibits no maxillary sinus tenderness and no frontal sinus tenderness.   Mouth/Throat: Uvula is midline and mucous membranes are normal. No trismus in the jaw. Normal dentition. No uvula swelling. Posterior oropharyngeal erythema present. No oropharyngeal exudate or posterior oropharyngeal edema.   Eyes: Conjunctivae and lids are normal. No scleral icterus.   Neck: Trachea normal and phonation normal. Neck supple. No edema present. No erythema present. No neck rigidity present.   Cardiovascular: Normal rate, regular rhythm, normal heart sounds and normal pulses.   No murmur heard.  Pulmonary/Chest: Effort normal and breath sounds normal. No respiratory distress. She has no decreased breath sounds. She has no wheezes. She has no rhonchi.   Abdominal: Normal appearance.   Musculoskeletal: Normal range of motion.         General: No deformity. Normal range of motion.   Lymphadenopathy:     She has no cervical adenopathy.   Neurological: She is alert and oriented to person, place, and time. She exhibits normal muscle tone. Coordination normal.   Skin: Skin is warm, dry, intact, not diaphoretic and not  pale.   Psychiatric: Her speech is normal and behavior is normal. Judgment and thought content normal.   Nursing note and vitals reviewed.          Assessment:       1. Bronchitis          Plan:         Bronchitis  -     predniSONE (DELTASONE) 10 MG tablet; Take 1 tablet (10 mg total) by mouth once daily. for 5 days  Dispense: 5 tablet; Refill: 0  -     cetirizine (ZYRTEC) 10 MG tablet; Take 1 tablet (10 mg total) by mouth once daily.  Dispense: 30 tablet; Refill: 0  -     fluticasone propionate (FLONASE) 50 mcg/actuation nasal spray; 1 spray (50 mcg total) by Each Nostril route once daily.  Dispense: 11.1 mL; Refill: 0  -     benzonatate (TESSALON PERLES) 100 MG capsule; Take 2 capsules (200 mg total) by mouth 3 (three) times daily as needed for Cough.  Dispense: 60 capsule; Refill: 0  -     promethazine-dextromethorphan (PROMETHAZINE-DM) 6.25-15 mg/5 mL Syrp; Take 5 mLs by mouth nightly as needed (cough).  Dispense: 50 mL; Refill: 0       Discussed results with patient.  Discussed use of OTC medications for symptom control as this is likely a viral disease.   All ER precautions covered including but not limited to shortness of breath, intractable fever, or chest pain.  Discussed RTC if symptoms worsen, change, or persist.     Patient verbalized understanding and agreed with the plan.     Dali Arce PA-C    Patient Instructions   PLEASE READ YOUR DISCHARGE INSTRUCTIONS ENTIRELY AS IT CONTAINS IMPORTANT INFORMATION.      Please drink plenty of fluids.    Please get plenty of rest.    Please return here or go to the Emergency Department for any concerns or worsening of condition.    Please take an over the counter antihistamine medication (allegra/Claritin/Zyrtec) of your choice as directed.    Try an over the counter decongestant like Mucinex D or Sudafed. You buy this behind the pharmacy counter    If you do have Hypertension or palpitations, it is safe to take Coricidin HBP for relief of sinus symptoms.    If  not allergic, please take over the counter Tylenol (Acetaminophen) and/or Motrin (Ibuprofen) as directed for control of pain and/or fever.  Please follow up with your primary care doctor or specialist as needed.    Sore throat recommendations: Warm fluids, warm salt water gargles, throat lozenges, tea, honey, soup, rest, hydration.    Use over the counter flonase: one spray each nostril twice daily OR two sprays each nostril once daily.     Sinus rinses DO NOT USE TAP WATER, if you must, water must be a rolling boil for 1 minute, let it cool, then use.  May use distilled water, or over the counter nasal saline rinses.  Vics vapor rub in shower to help open nasal passages.  May use nasal gel to keep passages moisturized.  May use Nasal saline sprays during the day for added relief of congestion.   For those who go to the gym, please do not use the sauna or steam room now to clear sinuses.    If you  smoke, please stop smoking.      Please return or see your primary care doctor if you develop new or worsening symptoms.     Please arrange follow up with your primary medical clinic as soon as possible. You must understand that you've received an Urgent Care treatment only and that you may be released before all of your medical problems are known or treated. You, the patient, will arrange for follow up as instructed. If your symptoms worsen or fail to improve you should go to the Emergency Room.

## 2022-11-08 NOTE — LETTER
November 8, 2022      [facility]  [address] [place] [address] [place] [address]  Phone: [phone]  Fax: [phone]       Patient: Annmarie Chino   YOB: 2000  Date of Visit: 11/08/2022    To Whom It May Concern:    Mar Chino  was at [facility] on 11/08/2022. The patient may return to work/school on 11/10/22 with no restrictions. If you have any questions or concerns, or if I can be of further assistance, please do not hesitate to contact me.    Sincerely,    Dali Arce PA-C

## 2022-12-11 ENCOUNTER — PATIENT MESSAGE (OUTPATIENT)
Dept: FAMILY MEDICINE | Facility: CLINIC | Age: 22
End: 2022-12-11
Payer: OTHER GOVERNMENT

## 2022-12-11 ENCOUNTER — OFFICE VISIT (OUTPATIENT)
Dept: URGENT CARE | Facility: CLINIC | Age: 22
End: 2022-12-11
Payer: OTHER GOVERNMENT

## 2022-12-11 VITALS
BODY MASS INDEX: 25.18 KG/M2 | WEIGHT: 170 LBS | RESPIRATION RATE: 16 BRPM | TEMPERATURE: 100 F | HEART RATE: 87 BPM | HEIGHT: 69 IN | DIASTOLIC BLOOD PRESSURE: 63 MMHG | OXYGEN SATURATION: 97 % | SYSTOLIC BLOOD PRESSURE: 115 MMHG

## 2022-12-11 DIAGNOSIS — J35.1 ENLARGED TONSILS: Primary | ICD-10-CM

## 2022-12-11 DIAGNOSIS — J03.90 EXUDATIVE TONSILLITIS: Primary | ICD-10-CM

## 2022-12-11 DIAGNOSIS — J02.9 SORE THROAT: ICD-10-CM

## 2022-12-11 LAB
CTP QC/QA: YES
MOLECULAR STREP A: NEGATIVE

## 2022-12-11 PROCEDURE — 87651 STREP A DNA AMP PROBE: CPT | Mod: QW,S$GLB,, | Performed by: NURSE PRACTITIONER

## 2022-12-11 PROCEDURE — 99212 OFFICE O/P EST SF 10 MIN: CPT | Mod: S$GLB,,, | Performed by: NURSE PRACTITIONER

## 2022-12-11 PROCEDURE — 99212 PR OFFICE/OUTPT VISIT, EST, LEVL II, 10-19 MIN: ICD-10-PCS | Mod: S$GLB,,, | Performed by: NURSE PRACTITIONER

## 2022-12-11 PROCEDURE — 87651 POCT STREP A MOLECULAR: ICD-10-PCS | Mod: QW,S$GLB,, | Performed by: NURSE PRACTITIONER

## 2022-12-11 NOTE — PATIENT INSTRUCTIONS
Perform warm salt water gargles or use a nonalcoholic mouth wash to loosen tonsil stones.    Please follow up with your Primary care provider within 2-5 days if your signs and symptoms have not resolved or worsen.     If your condition worsens or fails to improve we recommend that you receive another evaluation at the emergency room immediately or contact your primary medical clinic to discuss your concerns.   You must understand that you have received an Urgent Care treatment only and that you may be released before all of your medical problems are known or treated. You, the patient, will arrange for follow up care as instructed.     RED FLAGS/WARNING SYMPTOMS DISCUSSED WITH PATIENT THAT WOULD WARRANT EMERGENT MEDICAL ATTENTION. PATIENT VERBALIZED UNDERSTANDING.

## 2022-12-11 NOTE — PROGRESS NOTES
"Subjective:       Patient ID: Annmarie Chino is a 22 y.o. female.    Vitals:  height is 5' 9" (1.753 m) and weight is 77.1 kg (170 lb). Her temperature is 99.5 °F (37.5 °C). Her blood pressure is 115/63 and her pulse is 87. Her respiration is 16 and oxygen saturation is 97%.     Chief Complaint: Sore Throat    Pt present for white patches in her throat that she noticed yesterday. Pt states she has tonsil stones. Pt states her tonsils were bleeding.    Reports yesterday she looked in the back of her mouth and saw tonsil stones   Reports she scrapped them off with a Q-tip and it started bleeding  Denies any pain   Reports she does vape    Sore Throat   This is a new problem. The current episode started yesterday. The problem has been gradually worsening. There has been no fever. The pain is at a severity of 0/10. The patient is experiencing no pain. Associated symptoms include swollen glands. Pertinent negatives include no abdominal pain, congestion, coughing, diarrhea, drooling, ear discharge, ear pain, headaches, hoarse voice, plugged ear sensation, neck pain, shortness of breath, stridor, trouble swallowing or vomiting. She has tried nothing for the symptoms.     Constitution: Negative for chills, fatigue and fever.   HENT:  Negative for ear pain, ear discharge, drooling, congestion, sore throat and trouble swallowing.    Neck: Negative for neck pain.   Respiratory:  Negative for cough, shortness of breath and stridor.    Gastrointestinal:  Negative for abdominal pain, vomiting and diarrhea.   Neurological:  Negative for headaches.     Objective:      Physical Exam   Constitutional: She appears well-developed.  Non-toxic appearance. She does not appear ill. No distress.   HENT:   Head: Normocephalic and atraumatic.   Ears:   Right Ear: External ear normal.   Left Ear: External ear normal.   Nose: Nose normal. No rhinorrhea or congestion.   Mouth/Throat: Oropharyngeal exudate present. No posterior " oropharyngeal erythema.   Eyes: Conjunctivae and EOM are normal.   Neck: Neck supple.   Cardiovascular: Normal rate and regular rhythm.   Pulmonary/Chest: Effort normal and breath sounds normal. No stridor. No respiratory distress. She has no wheezes. She has no rhonchi. She has no rales.   Abdominal: Normal appearance.   Musculoskeletal: Normal range of motion.         General: Normal range of motion.   Neurological: no focal deficit. She is alert. She displays no weakness. Gait normal.   Skin: Skin is warm, dry, not diaphoretic, not pale and no rash.   Psychiatric: Her behavior is normal.     Results for orders placed or performed in visit on 12/11/22   POCT Strep A, Molecular   Result Value Ref Range    Molecular Strep A, POC Negative Negative     Acceptable Yes          Assessment:       1. Exudative tonsillitis    2. Sore throat          Plan:         Exudative tonsillitis    Sore throat  -     POCT Strep A, Molecular               Patient Instructions   Perform warm salt water gargles or use a nonalcoholic mouth wash to loosen tonsil stones.    Please follow up with your Primary care provider within 2-5 days if your signs and symptoms have not resolved or worsen.     If your condition worsens or fails to improve we recommend that you receive another evaluation at the emergency room immediately or contact your primary medical clinic to discuss your concerns.   You must understand that you have received an Urgent Care treatment only and that you may be released before all of your medical problems are known or treated. You, the patient, will arrange for follow up care as instructed.     RED FLAGS/WARNING SYMPTOMS DISCUSSED WITH PATIENT THAT WOULD WARRANT EMERGENT MEDICAL ATTENTION. PATIENT VERBALIZED UNDERSTANDING.

## 2022-12-23 ENCOUNTER — TELEPHONE (OUTPATIENT)
Dept: OBSTETRICS AND GYNECOLOGY | Facility: CLINIC | Age: 22
End: 2022-12-23
Payer: OTHER GOVERNMENT

## 2022-12-23 VITALS
HEART RATE: 106 BPM | SYSTOLIC BLOOD PRESSURE: 102 MMHG | HEIGHT: 69 IN | WEIGHT: 173 LBS | DIASTOLIC BLOOD PRESSURE: 69 MMHG | BODY MASS INDEX: 25.62 KG/M2

## 2022-12-23 DIAGNOSIS — N89.8 VAGINAL IRRITATION: ICD-10-CM

## 2022-12-23 DIAGNOSIS — Z30.013 ENCOUNTER FOR INITIAL PRESCRIPTION OF INJECTABLE CONTRACEPTIVE: Primary | ICD-10-CM

## 2022-12-23 DIAGNOSIS — Z11.3 SCREENING FOR STDS (SEXUALLY TRANSMITTED DISEASES): ICD-10-CM

## 2022-12-23 DIAGNOSIS — N94.10 DYSPAREUNIA IN FEMALE: ICD-10-CM

## 2022-12-23 LAB
B-HCG UR QL: NEGATIVE
CTP QC/QA: YES

## 2022-12-23 PROCEDURE — 81025 URINE PREGNANCY TEST: CPT | Mod: S$GLB,,,

## 2022-12-23 PROCEDURE — 96372 THER/PROPH/DIAG INJ SC/IM: CPT | Mod: S$GLB,,,

## 2022-12-23 PROCEDURE — 87491 CHLMYD TRACH DNA AMP PROBE: CPT

## 2022-12-23 PROCEDURE — 96372 PR INJECTION,THERAP/PROPH/DIAG2ST, IM OR SUBCUT: ICD-10-PCS | Mod: S$GLB,,,

## 2022-12-23 PROCEDURE — 99204 PR OFFICE/OUTPT VISIT, NEW, LEVL IV, 45-59 MIN: ICD-10-PCS | Mod: 25,S$GLB,,

## 2022-12-23 PROCEDURE — 81514 NFCT DS BV&VAGINITIS DNA ALG: CPT

## 2022-12-23 PROCEDURE — 99204 OFFICE O/P NEW MOD 45 MIN: CPT | Mod: 25,S$GLB,,

## 2022-12-23 PROCEDURE — 81025 POCT URINE PREGNANCY: ICD-10-PCS | Mod: S$GLB,,,

## 2022-12-23 PROCEDURE — 87591 N.GONORRHOEAE DNA AMP PROB: CPT

## 2022-12-23 RX ORDER — DROSPIRENONE AND ETHINYL ESTRADIOL 0.03MG-3MG
1 KIT ORAL
COMMUNITY
Start: 2022-11-12 | End: 2022-12-23

## 2022-12-23 RX ORDER — MEDROXYPROGESTERONE ACETATE 150 MG/ML
150 INJECTION, SUSPENSION INTRAMUSCULAR
Status: DISCONTINUED | OUTPATIENT
Start: 2022-12-23 | End: 2023-03-10

## 2022-12-23 RX ADMIN — MEDROXYPROGESTERONE ACETATE 150 MG: 150 INJECTION, SUSPENSION INTRAMUSCULAR at 03:12

## 2022-12-23 NOTE — PROGRESS NOTES
"SUBJECTIVE:       Chief Complaint: Contraception (Here to discuss contraception)    History of Present Illness: Montserrat Chino is a 22 y.o. female  presenting for discussion of contraception. Patient reports she is currently sexually active with one male partner. She currently uses no method for contraception. She does not use condoms for STD/STI prevention. She recently discontinued taking her OCPs after taking multiple pills per day in an attempt to "stop my period." Patient reports she had increased bleeding with clots that has since resolved. She also complains of left-sided vaginal wall irritation with sex. Patient reports she has had this irritation consistently since age 20. 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    Review of Systems   Constitutional:  Negative for activity change, appetite change, chills, fatigue, fever and unexpected weight change.   HENT:  Negative for nasal congestion, dental problem, ear pain, postnasal drip, rhinorrhea, sinus pressure/congestion, sneezing and sore throat.    Eyes:  Negative for discharge, visual disturbance and eye dryness.   Respiratory:  Negative for cough, chest tightness and shortness of breath.    Cardiovascular:  Negative for chest pain, palpitations and leg swelling.   Gastrointestinal:  Negative for abdominal distention, abdominal pain, change in bowel habit, constipation, diarrhea, nausea, vomiting, reflux and change in bowel habit.   Endocrine: Negative for cold intolerance, heat intolerance, polydipsia and polyuria.   Genitourinary:  Positive for vaginal pain ("left side"). Negative for difficulty urinating, dyspareunia, dysuria, flank pain, frequency, genital sores, hematuria, hot flashes, menstrual irregularity, menstrual problem, pelvic pain, urgency, vaginal bleeding, vaginal discharge and vaginal dryness.   Musculoskeletal:  Negative for back pain, myalgias, neck pain and neck stiffness.   Integumentary:  Negative " "for rash, breast mass, breast discharge and breast tenderness.   Allergic/Immunologic: Negative for environmental allergies and immunocompromised state.   Neurological:  Negative for dizziness, syncope, weakness, light-headedness, numbness and headaches.   Hematological:  Negative for adenopathy. Does not bruise/bleed easily.   Psychiatric/Behavioral:  Negative for confusion, decreased concentration and sleep disturbance. The patient is not nervous/anxious.    Breast: Negative for mass and tenderness      OBJECTIVE:      /69   Pulse 106   Ht 5' 9" (1.753 m)   Wt 78.5 kg (173 lb)   LMP 12/20/2022   BMI 25.55 kg/m²     Physical Exam:   Constitutional: She is oriented to person, place, and time. Vital signs are normal. She appears well-developed and well-nourished. She is cooperative.    HENT:   Head: Normocephalic and atraumatic.      Cardiovascular:  Normal rate and regular rhythm.             Pulmonary/Chest: Effort normal.                  Musculoskeletal: Moves all extremeties.      Right lower leg: No edema.      Left lower leg: No edema.       Neurological: She is alert and oriented to person, place, and time. GCS eye subscore is 4. GCS verbal subscore is 5. GCS motor subscore is 6.    Skin: Skin is warm and dry. Capillary refill takes less than 2 seconds.    Psychiatric: She has a normal mood and affect. Her speech is normal and behavior is normal. Mood, affect and thought content normal.       ASSESSMENT:       1. Encounter for initial prescription of injectable contraceptive    2. Screening for STDs (sexually transmitted diseases)    3. Vaginal irritation    4. Dyspareunia in female          PLAN:     Encounter for initial prescription of injectable contraceptive  -     POCT Urine Pregnancy; Standing  -     medroxyPROGESTERone (DEPO-PROVERA) injection 150 mg    Screening for STDs (sexually transmitted diseases)  -     C. trachomatis/N. gonorrhoeae by AMP DNA [facility]; Cervicovaginal  -     Vaginosis " Screen by DNA Probe    Vaginal irritation    Dyspareunia in female    Patient counseled today regarding contraceptive options including oral contraceptive pills, NuvaRing, transdermal patch, depo-medroxyprogesterone injection, intrauterine device, Nexplanon. Risks, benefits, and alternatives of all these were discussed at length. Patient has chosen Depo-Provera injections. Administration compliance discussed. STD/STI counseling provided. Patient understands this does not protect against STDs/STIs and that the only current method of protection against STDs/STIs is condom use. Educated on yearly STD screenings. Precautions given to follow-up if symptoms change.  Recommended consistent condom use and repeat testing in 3-6 months.    UPT negative.  Medroxyprogesterone 150 mg IM given.  See MAR for further details.  Patient tolerated well.  No signs or symptoms of adverse reaction after 15 minutes.  Patient instructed to return for next dose in 3 months.    Discussed possible vaginal dryness as cause since no sign of trauma or tissue disruption. Recommended water-based lubricants. Will rule out infection as cause first then consult MD as needed. Patient agreeable to treatment plan. Reassurance provided. All questions answered. Patient verbalized understanding.    Follow up in about 1 year (around 12/23/2023), or if symptoms worsen or fail to improve, for annual wellness exam.

## 2022-12-26 LAB
C TRACH DNA SPEC QL NAA+PROBE: NOT DETECTED
N GONORRHOEA DNA SPEC QL NAA+PROBE: NOT DETECTED

## 2022-12-27 ENCOUNTER — PATIENT MESSAGE (OUTPATIENT)
Dept: OBSTETRICS AND GYNECOLOGY | Facility: CLINIC | Age: 22
End: 2022-12-27
Payer: OTHER GOVERNMENT

## 2022-12-27 LAB
BACTERIAL VAGINOSIS DNA: NEGATIVE
CANDIDA GLABRATA DNA: NEGATIVE
CANDIDA KRUSEI DNA: NEGATIVE
CANDIDA RRNA VAG QL PROBE: POSITIVE
T VAGINALIS RRNA GENITAL QL PROBE: NEGATIVE

## 2022-12-27 NOTE — TELEPHONE ENCOUNTER
Patient is in [place] and would like medication sent to [facility] [id number] [place] [address]

## 2022-12-28 ENCOUNTER — OFFICE VISIT (OUTPATIENT)
Dept: URGENT CARE | Facility: CLINIC | Age: 22
End: 2022-12-28
Payer: OTHER GOVERNMENT

## 2022-12-28 VITALS
BODY MASS INDEX: 25.62 KG/M2 | HEIGHT: 69 IN | HEART RATE: 83 BPM | OXYGEN SATURATION: 98 % | DIASTOLIC BLOOD PRESSURE: 65 MMHG | TEMPERATURE: 98 F | RESPIRATION RATE: 16 BRPM | WEIGHT: 173 LBS | SYSTOLIC BLOOD PRESSURE: 97 MMHG

## 2022-12-28 DIAGNOSIS — B37.31 VAGINAL YEAST INFECTION: Primary | ICD-10-CM

## 2022-12-28 DIAGNOSIS — H92.02 OTALGIA, LEFT: ICD-10-CM

## 2022-12-28 DIAGNOSIS — J02.9 SORE THROAT: ICD-10-CM

## 2022-12-28 DIAGNOSIS — J03.90 TONSILLITIS: Primary | ICD-10-CM

## 2022-12-28 LAB
CTP QC/QA: YES
MOLECULAR STREP A: NEGATIVE

## 2022-12-28 PROCEDURE — 99213 PR OFFICE/OUTPT VISIT, EST, LEVL III, 20-29 MIN: ICD-10-PCS | Mod: S$GLB,,,

## 2022-12-28 PROCEDURE — 87651 POCT STREP A MOLECULAR: ICD-10-PCS | Mod: QW,S$GLB,,

## 2022-12-28 PROCEDURE — 87651 STREP A DNA AMP PROBE: CPT | Mod: QW,S$GLB,,

## 2022-12-28 PROCEDURE — 99213 OFFICE O/P EST LOW 20 MIN: CPT | Mod: S$GLB,,,

## 2022-12-28 RX ORDER — AMOXICILLIN 500 MG/1
500 TABLET, FILM COATED ORAL EVERY 12 HOURS
Qty: 20 TABLET | Refills: 0 | Status: SHIPPED | OUTPATIENT
Start: 2022-12-28 | End: 2023-01-07

## 2022-12-28 RX ORDER — FLUCONAZOLE 150 MG/1
150 TABLET ORAL ONCE
Qty: 1 TABLET | Refills: 0 | Status: SHIPPED | OUTPATIENT
Start: 2022-12-28 | End: 2022-12-28

## 2022-12-28 NOTE — PROGRESS NOTES
"Subjective:       Patient ID: Annmarie Chino is a 22 y.o. female.    Vitals:  height is 5' 9" (1.753 m) and weight is 78.5 kg (173 lb). Her temperature is 98.2 °F (36.8 °C). Her blood pressure is 97/65 and her pulse is 83. Her respiration is 16 and oxygen saturation is 98%.     Chief Complaint: Otalgia (Left ear)    Annmarie Chino is a 22 y.o. female who presents for L ear pain which onset yesterday. Associated sxs include L jaw pain. No hearing loss or discharge. Patient denies any fever, chills, SOB, CP, abd pain, n/v/d, rash, dizziness, or numbness/tingling. She is also c/o sore throat which onset 2 weeks ago. She was seen on 12/11 and dx with exudative tonsillitis. Patient has noticed white patches on her throat and breath odor.     Otalgia   There is pain in the right ear. This is a new problem. The current episode started yesterday. The problem occurs constantly. The problem has been gradually worsening. There has been no fever. The pain is at a severity of 4/10. Pertinent negatives include no abdominal pain, coughing, diarrhea, ear discharge, headaches, hearing loss, neck pain, rash, rhinorrhea, sore throat or vomiting. She has tried nothing for the symptoms.     Constitution: Negative for appetite change, chills, sweating, fatigue and fever.   HENT:  Positive for ear pain. Negative for ear discharge, foreign body in ear, hearing loss, congestion, postnasal drip, sinus pain, sinus pressure, sore throat and trouble swallowing.    Neck: Negative for neck pain.   Cardiovascular:  Negative for chest pain.   Respiratory:  Negative for cough, sputum production and shortness of breath.    Gastrointestinal:  Negative for abdominal pain, nausea, vomiting and diarrhea.   Musculoskeletal:  Negative for muscle ache.   Skin:  Negative for rash.   Neurological:  Negative for dizziness, headaches, numbness and tingling.     Objective:      Physical Exam   Constitutional: She is oriented to person, " place, and time. She appears well-developed. She is cooperative.  Non-toxic appearance. She does not appear ill. No distress.   HENT:   Head: Normocephalic and atraumatic.   Ears:   Right Ear: Hearing, tympanic membrane, external ear and ear canal normal. There is tenderness.   Left Ear: Hearing, tympanic membrane, external ear and ear canal normal.   Ears:    Nose: Nose normal. No mucosal edema or nasal deformity. No epistaxis. Right sinus exhibits no maxillary sinus tenderness and no frontal sinus tenderness. Left sinus exhibits no maxillary sinus tenderness and no frontal sinus tenderness.   Mouth/Throat: Uvula is midline and mucous membranes are normal. Mucous membranes are moist. No trismus in the jaw. Normal dentition. No uvula swelling. Oropharyngeal exudate and posterior oropharyngeal erythema present. No posterior oropharyngeal edema.   Eyes: Conjunctivae and lids are normal. No scleral icterus. Extraocular movement intact   Neck: Trachea normal and phonation normal. Neck supple. No edema present. No erythema present. No neck rigidity present.   Cardiovascular: Normal rate, regular rhythm, normal heart sounds and normal pulses.   Pulmonary/Chest: Effort normal and breath sounds normal. No stridor. No respiratory distress. She has no decreased breath sounds. She has no wheezes. She has no rhonchi. She has no rales.   Abdominal: Normal appearance.   Musculoskeletal: Normal range of motion.         General: No deformity. Normal range of motion.   Neurological: She is alert and oriented to person, place, and time. She exhibits normal muscle tone. Coordination normal.   Skin: Skin is warm, dry, intact, not diaphoretic and not pale.   Psychiatric: Her speech is normal and behavior is normal. Judgment and thought content normal.   Nursing note and vitals reviewed.      Assessment:       1. Tonsillitis    2. Sore throat    3. Otalgia, left           Results for orders placed or performed in visit on 12/28/22   POCT  Strep A, Molecular   Result Value Ref Range    Molecular Strep A, POC Negative Negative     Acceptable Yes        Plan:         Tonsillitis  -     amoxicillin (AMOXIL) 500 MG Tab; Take 1 tablet (500 mg total) by mouth every 12 (twelve) hours. for 10 days  Dispense: 20 tablet; Refill: 0  -     (Magic mouthwash) 1:1:1 diphenhydrAMINE(Benadryl) 12.5mg/5ml liq, aluminum & magnesium hydroxide-simethicone (Maalox), LIDOcaine viscous 2%; Swish and spit 5 mLs every 4 (four) hours as needed (for sore throat).  Dispense: 90 mL; Refill: 0    Sore throat  -     POCT Strep A, Molecular    Otalgia, left      Will trial course of Amoxicillin. Magic mouthwash also sent to preferred pharmacy.  Tonsilliths probable cause of breath odor.  Recommend follow-up with ENT for further evaluation and/or tonsillectomy.  Prior ENT referral is in the system.  Recommend warm compresses to L ear for relief.  Tylenol/Ibuprofen as needed for any pain or discomfort.  RTC for any worsening sxs.    Discussed with patient all pertinent information and results. Discussed patient diagnosis and plan of treatment. Patient was given all follow up and return instructions. All questions and concerns were addressed at this time. Patient expresses understanding of information and instructions, and is comfortable with plan.    Patient was instructed to return to clinic or go to ED immediately for any worsening or change in current symptoms.

## 2023-01-10 ENCOUNTER — TELEPHONE (OUTPATIENT)
Dept: OBSTETRICS AND GYNECOLOGY | Facility: CLINIC | Age: 23
End: 2023-01-10
Payer: OTHER GOVERNMENT

## 2023-01-10 DIAGNOSIS — B37.31 VAGINAL YEAST INFECTION: Primary | ICD-10-CM

## 2023-01-10 NOTE — TELEPHONE ENCOUNTER
Pt stated that she was treated with fluconazole at her appointment 12/2022.  She is having the same symptoms vaginal discharge and itching.  Requesting a refill with additional refills to be sent to the [facility] on file.  NYLA

## 2023-01-11 RX ORDER — FLUCONAZOLE 150 MG/1
150 TABLET ORAL ONCE
Qty: 1 TABLET | Refills: 0 | Status: SHIPPED | OUTPATIENT
Start: 2023-01-11 | End: 2023-01-11

## 2023-01-11 RX ORDER — FLUCONAZOLE 150 MG/1
150 TABLET ORAL ONCE
COMMUNITY
Start: 2022-12-28 | End: 2023-01-11 | Stop reason: SDUPTHER

## 2023-02-15 ENCOUNTER — TELEPHONE (OUTPATIENT)
Dept: OBSTETRICS AND GYNECOLOGY | Facility: CLINIC | Age: 23
End: 2023-02-15
Payer: OTHER GOVERNMENT

## 2023-02-15 NOTE — TELEPHONE ENCOUNTER
----- Message from Jesus Eid sent at 2/15/2023 11:04 AM CST -----  Regarding: Advice  Contact: Patient  Type:  Needs Medical Advice    Who Called: Patient  Symptoms (please be specific):    How long has patient had these symptoms:    Pharmacy name and phone #:    Would the patient rather a call back or a response via [facility]? call  Best Call Back Number: [phone]  Additional Information: Patient called regarding her Depo Shot. Patient has questions about this shot. Please call patient to advise.

## 2023-02-27 ENCOUNTER — OFFICE VISIT (OUTPATIENT)
Dept: URGENT CARE | Facility: CLINIC | Age: 23
End: 2023-02-27
Payer: OTHER GOVERNMENT

## 2023-02-27 VITALS
RESPIRATION RATE: 16 BRPM | HEART RATE: 78 BPM | HEIGHT: 69 IN | OXYGEN SATURATION: 97 % | BODY MASS INDEX: 25.62 KG/M2 | TEMPERATURE: 99 F | SYSTOLIC BLOOD PRESSURE: 89 MMHG | DIASTOLIC BLOOD PRESSURE: 61 MMHG | WEIGHT: 173 LBS

## 2023-02-27 DIAGNOSIS — R42 LIGHTHEADEDNESS: ICD-10-CM

## 2023-02-27 DIAGNOSIS — R53.83 FATIGUE, UNSPECIFIED TYPE: ICD-10-CM

## 2023-02-27 DIAGNOSIS — N92.1 MENORRHAGIA WITH IRREGULAR CYCLE: Primary | ICD-10-CM

## 2023-02-27 PROCEDURE — 99214 PR OFFICE/OUTPT VISIT, EST, LEVL IV, 30-39 MIN: ICD-10-PCS | Mod: S$GLB,,,

## 2023-02-27 PROCEDURE — 99214 OFFICE O/P EST MOD 30 MIN: CPT | Mod: S$GLB,,,

## 2023-02-27 NOTE — LETTER
February 27, 2023      [facility]  [address] [place] [address] [place] [address]  Phone: [phone]  Fax: [phone]       Patient: Annmarie Chino   YOB: 2000  Date of Visit: 02/27/2023    To Whom It May Concern:    Mar Chino  was at [facility] on 02/27/2023. The patient may return to work/school on 3/1/23 with no restrictions. If you have any questions or concerns, or if I can be of further assistance, please do not hesitate to contact me.    Sincerely,    Elaine Nichole PA-C

## 2023-02-27 NOTE — PROGRESS NOTES
"Subjective:       Patient ID: Annmarie Chino is a 22 y.o. female.    Vitals:  height is 5' 9" (1.753 m) and weight is 78.5 kg (173 lb). Her temperature is 99.2 °F (37.3 °C). Her blood pressure is 89/61 (abnormal) and her pulse is 78. Her respiration is 16 and oxygen saturation is 97%.     Chief Complaint: Fatigue    Annmarie Chino is a 22 y.o. female who presents for fatigue which onset 2 weeks ago. Patient received the Depo Provera shot on 12/23. She reports continuous heavy flow daily for 8 weeks. Associated sxs include lightheadedness and near syncope. No episodes of syncope. She states she had to pull over while driving due to lightheadedness. Patient denies any fever, chills, SOB, CP, abd pain, or n/v/d.      Constitution: Positive for fatigue. Negative for chills, sweating and fever.   Cardiovascular:  Negative for chest pain and palpitations.   Respiratory:  Negative for shortness of breath.    Gastrointestinal:  Negative for abdominal pain, nausea, vomiting and diarrhea.   Genitourinary:  Positive for heavy menstrual bleeding. Negative for painful menstruation.   Neurological:  Positive for light-headedness. Negative for dizziness, headaches, loss of consciousness, numbness and tingling.     Objective:      Physical Exam   Constitutional: She is oriented to person, place, and time. She appears well-developed. She is cooperative. No distress.   HENT:   Head: Normocephalic and atraumatic.   Ears:   Right Ear: External ear normal.   Left Ear: External ear normal.   Nose: Nose normal.   Mouth/Throat: Oropharynx is clear and moist and mucous membranes are normal. Mucous membranes are moist.      Comments: Moist mucus membranes.  Eyes: Conjunctivae and lids are normal. Pupils are equal, round, and reactive to light. Right eye exhibits no nystagmus. Left eye exhibits no nystagmus. Extraocular movement intact   Neck: Trachea normal and phonation normal. Neck supple.   Cardiovascular: Normal " "rate, regular rhythm, normal heart sounds and normal pulses.   Pulmonary/Chest: Effort normal and breath sounds normal.   Abdominal: Normal appearance. Soft. flat abdomen   Musculoskeletal:         General: No deformity.   Neurological: She is alert, oriented to person, place, and time and at baseline. She has normal motor skills, normal sensation, normal strength and normal reflexes. No cranial nerve deficit or sensory deficit. Coordination: Romberg sign negative. Gait and coordination normal.   Skin: Skin is warm, dry, intact, not diaphoretic and pale (facial).         Comments: Normal skin turgor.   Psychiatric: Her speech is normal and behavior is normal. Judgment and thought content normal.   Nursing note and vitals reviewed.      Assessment:       1. Menorrhagia with irregular cycle    2. Fatigue, unspecified type    3. Lightheadedness          Plan:         Menorrhagia with irregular cycle    Fatigue, unspecified type    Lightheadedness    Chart reviewed.  Afebrile. Hypotensive.  BP consistent with previous visits. However, systolic BP never < 90 mmHg.  Concerns for progression to hypovolemic shock.  Advised patient to go to ER immediately for further workup. Patient declines. She states "I don't have time for that."  Patient declined transportation via EMS. Patient declined transportation via private vehicle.  Patient states she will wait until she feels worse before going to ER. Advised patient against that. Again, stressed importance of ER at this time. Patient declines.  Increase fluid intake.  Advised patient to speak to OBGYN for possible change in contraceptive method.   Strict ER precautions given for worsening symptoms and symptoms as fever, syncope, numbness/tingling.                   "

## 2023-02-28 ENCOUNTER — OFFICE VISIT (OUTPATIENT)
Dept: OBSTETRICS AND GYNECOLOGY | Facility: CLINIC | Age: 23
End: 2023-02-28
Payer: OTHER GOVERNMENT

## 2023-02-28 VITALS — HEIGHT: 69 IN | BODY MASS INDEX: 25.55 KG/M2 | DIASTOLIC BLOOD PRESSURE: 60 MMHG | SYSTOLIC BLOOD PRESSURE: 90 MMHG

## 2023-02-28 DIAGNOSIS — N92.1 BREAKTHROUGH BLEEDING ON DEPO-PROVERA: Primary | ICD-10-CM

## 2023-02-28 DIAGNOSIS — Z30.42 ENCOUNTER FOR SURVEILLANCE OF INJECTABLE CONTRACEPTIVE: ICD-10-CM

## 2023-02-28 PROCEDURE — 99213 PR OFFICE/OUTPT VISIT, EST, LEVL III, 20-29 MIN: ICD-10-PCS | Mod: S$PBB,,, | Performed by: OBSTETRICS & GYNECOLOGY

## 2023-02-28 PROCEDURE — 99213 OFFICE O/P EST LOW 20 MIN: CPT | Mod: S$PBB,,, | Performed by: OBSTETRICS & GYNECOLOGY

## 2023-02-28 PROCEDURE — 99213 OFFICE O/P EST LOW 20 MIN: CPT | Mod: PBBFAC | Performed by: OBSTETRICS & GYNECOLOGY

## 2023-02-28 PROCEDURE — 99999 PR PBB SHADOW E&M-EST. PATIENT-LVL III: ICD-10-PCS | Mod: PBBFAC,,, | Performed by: OBSTETRICS & GYNECOLOGY

## 2023-02-28 PROCEDURE — 99999 PR PBB SHADOW E&M-EST. PATIENT-LVL III: CPT | Mod: PBBFAC,,, | Performed by: OBSTETRICS & GYNECOLOGY

## 2023-02-28 RX ORDER — MEDROXYPROGESTERONE ACETATE 150 MG/ML
150 INJECTION, SUSPENSION INTRAMUSCULAR
Status: DISCONTINUED | OUTPATIENT
Start: 2023-02-28 | End: 2023-03-10

## 2023-02-28 RX ORDER — ESTRADIOL 2 MG/1
2 TABLET ORAL DAILY
Qty: 30 TABLET | Refills: 0 | Status: SHIPPED | OUTPATIENT
Start: 2023-02-28 | End: 2023-03-10

## 2023-02-28 NOTE — PROGRESS NOTES
Subjective:       Patient ID: Annmarie Chino is a 22 y.o. female.    Chief Complaint:  Contraception      History of Present Illness  Dysfunctional Uterine Bleeding  Patient complains of irregular menses. She had been bleeding regularly until she started Depo Provera in .  She started bleeding within days of the shot and has not stopped bleeding since.  Flow varies from light to moderate on most days.  Has occasional clots. Dysmenorrhea: moderate. Cyclic symptoms include: none. Current contraception: Depo-Provera injections.  Pt is sexually active and recent STD screen was negative.  Pt gets Depo from her PCP in [place] but would like to transfer her injections to  as she is a student at [employer].    GYN & OB History  No LMP recorded. Patient has had an injection.   Date of Last Pap: No result found    OB History    Para Term  AB Living   0 0 0 0 0 0   SAB IAB Ectopic Multiple Live Births   0 0 0 0 0       Review of Systems  Review of Systems   Constitutional:  Positive for fatigue. Negative for activity change, appetite change, chills, fever and unexpected weight change.   Respiratory:  Negative for shortness of breath.    Cardiovascular:  Negative for chest pain, palpitations and leg swelling.   Gastrointestinal:  Negative for abdominal pain, bloating, blood in stool, constipation, diarrhea, nausea and vomiting.   Genitourinary:  Positive for menstrual problem and vaginal bleeding. Negative for dysmenorrhea, dyspareunia, dysuria, flank pain, frequency, genital sores, hematuria, menorrhagia, pelvic pain, urgency, vaginal discharge, vaginal pain, urinary incontinence, postcoital bleeding, vaginal dryness and vaginal odor.   Musculoskeletal:  Negative for back pain.   Integumentary:  Negative for rash.   Neurological:  Negative for syncope and headaches.         Objective:    Physical Exam:   Constitutional: She is oriented to person, place, and time. She appears well-developed and  well-nourished. No distress.                           Neurological: She is alert and oriented to person, place, and time.     Psychiatric: She has a normal mood and affect. Her behavior is normal. Thought content normal.        Assessment:        1. Breakthrough bleeding on Depo-Provera    2. Encounter for surveillance of injectable contraceptive              Plan:      Breakthrough bleeding on Depo-Provera  -     CBC Auto Differential; Future; Expected date: 02/28/2023  -     TSH; Future; Expected date: 02/28/2023  -     estradioL (ESTRACE) 2 MG tablet; Take 1 tablet (2 mg total) by mouth once daily.  Dispense: 30 tablet; Refill: 0  -     Pt is on first injection (2 months ago) and symptoms are consistent with breakthrough bleeding.  Pt counseled on options.   Desires to proceed with estrogen and continue with Depo Provera when due in mid March.    Encounter for surveillance of injectable contraceptive  -     medroxyPROGESTERone (DEPO-PROVERA) syringe 150 mg      Follow up in about 10 months (around 12/28/2023) for Annual exam.

## 2023-03-01 ENCOUNTER — PATIENT MESSAGE (OUTPATIENT)
Dept: FAMILY MEDICINE | Facility: CLINIC | Age: 23
End: 2023-03-01
Payer: OTHER GOVERNMENT

## 2023-03-10 ENCOUNTER — OFFICE VISIT (OUTPATIENT)
Dept: OBSTETRICS AND GYNECOLOGY | Facility: CLINIC | Age: 23
End: 2023-03-10
Payer: OTHER GOVERNMENT

## 2023-03-10 VITALS
SYSTOLIC BLOOD PRESSURE: 110 MMHG | WEIGHT: 170 LBS | DIASTOLIC BLOOD PRESSURE: 82 MMHG | HEIGHT: 69 IN | BODY MASS INDEX: 25.18 KG/M2

## 2023-03-10 DIAGNOSIS — N92.1 BREAKTHROUGH BLEEDING ON DEPO-PROVERA: Primary | ICD-10-CM

## 2023-03-10 DIAGNOSIS — Z30.011 OCP (ORAL CONTRACEPTIVE PILLS) INITIATION: ICD-10-CM

## 2023-03-10 PROCEDURE — 99213 OFFICE O/P EST LOW 20 MIN: CPT | Mod: PBBFAC

## 2023-03-10 PROCEDURE — 99214 PR OFFICE/OUTPT VISIT, EST, LEVL IV, 30-39 MIN: ICD-10-PCS | Mod: S$PBB,,,

## 2023-03-10 PROCEDURE — 99999 PR PBB SHADOW E&M-EST. PATIENT-LVL III: ICD-10-PCS | Mod: PBBFAC,,,

## 2023-03-10 PROCEDURE — 99214 OFFICE O/P EST MOD 30 MIN: CPT | Mod: S$PBB,,,

## 2023-03-10 PROCEDURE — 99999 PR PBB SHADOW E&M-EST. PATIENT-LVL III: CPT | Mod: PBBFAC,,,

## 2023-03-10 RX ORDER — IBUPROFEN 800 MG/1
800 TABLET ORAL EVERY 8 HOURS
Qty: 30 TABLET | Refills: 0 | Status: SHIPPED | OUTPATIENT
Start: 2023-03-10 | End: 2023-06-16

## 2023-03-10 RX ORDER — DROSPIRENONE AND ETHINYL ESTRADIOL 0.03MG-3MG
1 KIT ORAL DAILY
Qty: 84 TABLET | Refills: 4 | Status: SHIPPED | OUTPATIENT
Start: 2023-03-10 | End: 2023-06-16

## 2023-03-10 NOTE — PROGRESS NOTES
Subjective:       Patient ID: Annmarie Chino is a 22 y.o. female.    Chief Complaint:  Contraception      History of Present Illness  HPI  Contraception Counseling  Patient presents to switch contraception from Depo Provera back to OCP. The patient has complaints today of irregular bleeding since starting Depo in December. Patient was prescribed daily estrogen by Dr. Nelson in January and patient states the bleeding has still not resolved completely. The patient is sexually active, MM relationship. Pertinent past medical history: none.  Patient was due for Depo today. Previously on KRISTEL, OCP, and did well.   All vaginal cultures in December were negative.     GYN & OB History  No LMP recorded. Patient has had an injection.   Date of Last Pap: No result found    OB History    Para Term  AB Living   0 0 0 0 0 0   SAB IAB Ectopic Multiple Live Births   0 0 0 0 0       Review of Systems  Review of Systems   Constitutional:  Negative for appetite change, fatigue and fever.   Gastrointestinal:  Negative for abdominal pain, bloating, constipation, diarrhea, nausea and vomiting.   Genitourinary:  Positive for menstrual problem. Negative for bladder incontinence, dysmenorrhea, dyspareunia, dysuria, flank pain, frequency, genital sores, menorrhagia, pelvic pain, urgency, vaginal bleeding, vaginal discharge, vaginal pain, postcoital bleeding, vaginal dryness and vaginal odor.   All other systems reviewed and are negative.        Objective:      Physical Exam:   Constitutional: She is oriented to person, place, and time. She appears well-developed and well-nourished. No distress.    HENT:   Head: Normocephalic and atraumatic.    Eyes: Pupils are equal, round, and reactive to light. Conjunctivae and EOM are normal.      Pulmonary/Chest: Effort normal.                  Musculoskeletal: Normal range of motion and moves all extremeties.       Neurological: She is alert and oriented to person, place, and  time.    Skin: Skin is warm and dry. No rash noted. She is not diaphoretic. No erythema. No pallor.    Psychiatric: She has a normal mood and affect. Her behavior is normal. Judgment and thought content normal.           Assessment:        1. Breakthrough bleeding on Depo-Provera    2. OCP (oral contraceptive pills) initiation               Plan:   Switch to OCP   [place] of NSAIDS for 7 days     Diagnosis and orders this visit:    Breakthrough bleeding on Depo-Provera  -     drospirenone-ethinyl estradioL (RAFFI) 3-0.03 mg per tablet; Take 1 tablet by mouth once daily.  Dispense: 84 tablet; Refill: 4  -     ibuprofen (ADVIL,MOTRIN) 800 MG tablet; Take 1 tablet (800 mg total) by mouth every 8 (eight) hours. For 7 days  Dispense: 30 tablet; Refill    OCP (oral contraceptive pills) initiation  -     drospirenone-ethinyl estradioL (RAFFI) 3-0.03 mg per tablet; Take 1 tablet by mouth once daily.  Dispense: 84 tablet; Refill: 4  - The use of the oral contraceptive has been fully discussed with the patient. This includes the proper method to initiate and continue the pills, the need for regular compliance to ensure adequate contraceptive effect, the physiology which make the pill effective, the instructions for what to do in event of a missed pill, and warnings about anticipated minor side effects such as breakthrough spotting, nausea, breast tenderness, weight changes, acne, headaches, etc.  She has been told of the more serious potential side effects such as MI, stroke, and deep vein thrombosis, all of which are very unlikely.  She has been asked to report any signs of such serious problems immediately.  She should back up the pill with a condom during any cycle in which antibiotics are prescribed, and during the first cycle as well. The need for additional protection, such as a condom, to prevent exposure to sexually transmitted diseases has also been discussed- the patient has been clearly reminded that OCP's cannot  protect her against diseases such as HIV and others. She understands and wishes to take the medication as prescribed.      Katia Castillo, NP

## 2023-03-14 ENCOUNTER — TELEPHONE (OUTPATIENT)
Dept: OBSTETRICS AND GYNECOLOGY | Facility: CLINIC | Age: 23
End: 2023-03-14
Payer: OTHER GOVERNMENT

## 2023-03-14 NOTE — TELEPHONE ENCOUNTER
Attempted to contact patient, no answer. Left patient voice mail to return call to clinic.    Regarding 3/24 appt, provider will not be in clinic, pt need to rs appt.

## 2023-03-29 ENCOUNTER — OFFICE VISIT (OUTPATIENT)
Dept: URGENT CARE | Facility: CLINIC | Age: 23
End: 2023-03-29
Payer: OTHER GOVERNMENT

## 2023-03-29 VITALS
DIASTOLIC BLOOD PRESSURE: 58 MMHG | RESPIRATION RATE: 16 BRPM | BODY MASS INDEX: 25.18 KG/M2 | HEIGHT: 69 IN | WEIGHT: 170 LBS | HEART RATE: 55 BPM | SYSTOLIC BLOOD PRESSURE: 92 MMHG | OXYGEN SATURATION: 99 % | TEMPERATURE: 98 F

## 2023-03-29 DIAGNOSIS — G43.109 MIGRAINE WITH AURA AND WITHOUT STATUS MIGRAINOSUS, NOT INTRACTABLE: Primary | ICD-10-CM

## 2023-03-29 PROCEDURE — 99213 PR OFFICE/OUTPT VISIT, EST, LEVL III, 20-29 MIN: ICD-10-PCS | Mod: S$GLB,,, | Performed by: PHYSICIAN ASSISTANT

## 2023-03-29 PROCEDURE — 99213 OFFICE O/P EST LOW 20 MIN: CPT | Mod: S$GLB,,, | Performed by: PHYSICIAN ASSISTANT

## 2023-03-29 NOTE — LETTER
March 29, 2023      [facility]  [address] [place] [address] [place] [address]  Phone: [phone]  Fax: [phone]       Patient: Annmarie Chino   YOB: 2000  Date of Visit: 03/29/2023    To Whom It May Concern:    Mar Chino  was at [facility] on 03/29/2023. The patient may return to work/school on 03/30/23 with no restrictions. If you have any questions or concerns, or if I can be of further assistance, please do not hesitate to contact me.    Sincerely,    Claire Camacho PA-C

## 2023-03-29 NOTE — PROGRESS NOTES
"Subjective:      Patient ID: Annmarie Chino is a 23 y.o. female.    Vitals:  height is 5' 9" (1.753 m) and weight is 77.1 kg (170 lb). Her temperature is 97.8 °F (36.6 °C). Her blood pressure is 92/58 (abnormal) and her pulse is 55 (abnormal). Her respiration is 16 and oxygen saturation is 99%.     Chief Complaint: Migraine    24 yo female with hx of migraine presents with eval for migraine today. States usual location and intensity behind the eyes, + light and sound sensitivity. No fever, trauma, neck stiffness, vomiting, URI symptoms. Did resolve after excedrin and maxalt. But states she is here for a work excuse as she missed today.     Migraine   This is a recurrent problem. The current episode started today. The problem occurs constantly. The problem has been resolved. The pain is located in the Occipital, temporal and bilateral region. The pain does not radiate. The pain quality is similar to prior headaches. The quality of the pain is described as stabbing and sharp. The pain is at a severity of 0/10. Associated symptoms include eye watering, nausea and vomiting. Pertinent negatives include no abdominal pain, back pain, blurred vision, coughing, dizziness, eye pain, eye redness, fever, loss of balance, muscle aches, neck pain, numbness, seizures, sinus pressure or weakness. The symptoms are aggravated by bright light. She has tried Excedrin, cold packs, darkened room and triptans for the symptoms. Her past medical history is significant for migraine headaches.     Constitution: Negative for chills, sweating, fatigue and fever.   HENT:  Negative for congestion, postnasal drip and sinus pressure.    Neck: Negative for neck pain and neck stiffness.   Cardiovascular:  Negative for chest pain, leg swelling and palpitations.   Eyes:  Negative for eye itching, eye pain, eye redness and blurred vision.   Respiratory:  Negative for cough, sputum production and shortness of breath.    Gastrointestinal:  " Positive for nausea and vomiting. Negative for abdominal pain and diarrhea.   Musculoskeletal:  Negative for back pain.   Skin:  Negative for rash and wound.   Neurological:  Positive for headaches and history of migraines. Negative for dizziness, history of vertigo, light-headedness, passing out, facial drooping, speech difficulty, coordination disturbances, loss of balance, disorientation, altered mental status, loss of consciousness, numbness, tingling and seizures.   Psychiatric/Behavioral:  Negative for altered mental status and disorientation.     Objective:     Physical Exam   Constitutional: She is oriented to person, place, and time. She appears well-developed.  Non-toxic appearance. She does not appear ill. No distress.   HENT:   Head: Normocephalic and atraumatic.   Ears:   Right Ear: Hearing, tympanic membrane, external ear and ear canal normal.   Left Ear: Hearing, tympanic membrane, external ear and ear canal normal.   Nose: Nose normal. No mucosal edema, rhinorrhea or nasal deformity. No epistaxis. Right sinus exhibits no maxillary sinus tenderness and no frontal sinus tenderness. Left sinus exhibits no maxillary sinus tenderness and no frontal sinus tenderness.   Mouth/Throat: Uvula is midline, oropharynx is clear and moist and mucous membranes are normal. No trismus in the jaw. Normal dentition. No uvula swelling. No posterior oropharyngeal erythema.   Eyes: Conjunctivae, EOM and lids are normal. Pupils are equal, round, and reactive to light. No scleral icterus.   Neck: Trachea normal and phonation normal. Neck supple. No neck rigidity present.   Cardiovascular: Regular rhythm, normal heart sounds and normal pulses. Bradycardia present.   Pulmonary/Chest: Effort normal and breath sounds normal. No respiratory distress.   Abdominal: Normal appearance and bowel sounds are normal. She exhibits no distension. Soft. There is no abdominal tenderness.   Musculoskeletal: Normal range of motion.          General: No deformity. Normal range of motion.   Neurological: no focal deficit. She is alert and oriented to person, place, and time. She has normal motor skills and normal sensation. No cranial nerve deficit. She exhibits normal muscle tone. Coordination normal. Coordination normal. GCS eye subscore is 4. GCS verbal subscore is 5. GCS motor subscore is 6.   Skin: Skin is warm, dry, intact, not diaphoretic and not pale.   Psychiatric: Her speech is normal and behavior is normal. Judgment and thought content normal.   Nursing note and vitals reviewed.    Assessment:     1. Migraine with aura and without status migrainosus, not intractable        Plan:       Migraine with aura and without status migrainosus, not intractable    Claire Camacho PA-C  [facility]

## 2023-04-12 DIAGNOSIS — Z11.59 NEED FOR HEPATITIS C SCREENING TEST: ICD-10-CM

## 2023-06-06 ENCOUNTER — PATIENT OUTREACH (OUTPATIENT)
Dept: ADMINISTRATIVE | Facility: HOSPITAL | Age: 23
End: 2023-06-06
Payer: OTHER GOVERNMENT

## 2023-06-06 ENCOUNTER — PATIENT MESSAGE (OUTPATIENT)
Dept: ADMINISTRATIVE | Facility: HOSPITAL | Age: 23
End: 2023-06-06
Payer: OTHER GOVERNMENT

## 2023-06-06 NOTE — PROGRESS NOTES
Population Health Chart Review & Patient Outreach Details:     Reason for Outreach Encounter:     [x]  Non-Compliant Report   []  Payor Report (Hum[employer], PHN, BCBS, MSSP, MCIP, C, etc.)   []  Pre-Visit Chart Review     Updates Requested / Reviewed:     [x]  Care Everywhere    [x]     []  External Sources ([facility], [facility], DIS, etc.)   []  Care Team Updated    Patient Outreach Method:    [x]  Telephone Outreach Completed   [] Successful   [x] Left Voicemail   [] Unable to Contact (wrong number, no voicemail)  [x]  [facility] Portal Outreach Sent  []  Letter Outreach Mailed  []  Fax Sent for External Records  []  External Records Upload    Health Maintenance Topics Addressed and Outreach Outcomes / Actions Taken:        []      Breast Cancer Screening []  Mammo Scheduled      []  External Records Requested     []  Added Reminder to Complete to Upcoming Primary Care Appt Notes     []  Patient Declined     []  Patient Will Call Back to Schedule     []  Patient Will Schedule with External Provider / Order Routed if Applicable             [x]       Cervical Cancer Screening []  Pap Scheduled      []  External Records Requested     []  Added Reminder to Complete to Upcoming Primary Care Appt Notes     []  Patient Declined     []  Patient Will Call Back to Schedule     []  Patient Will Schedule with External Provider               []          Colorectal Cancer Screening []  Colonoscopy Case Request or Referral Placed     []  External Records Requested     []  Added Reminder to Complete to Upcoming Primary Care Appt Notes     []  Patient Declined     []  Patient Will Call Back to Schedule     []  Patient Will Schedule with External Provider     []  Fit Kit Mailed (add the SmartPhrase under additional notes)     []  Reminded Patient to Complete Home Test             []      Diabetic Eye Exam []  Eye Camera Scheduled or Optometry Referral Placed     []  External Records Requested     []  Added Reminder to Complete  to Upcoming Primary Care Appt Notes     []  Patient Declined     []  Patient Will Call Back to Schedule     []  Patient Will Schedule with External Provider             []      Blood Pressure Control []  Primary Care Follow Up Visit Scheduled     []  Remote Blood Pressure Reading Captured     []  Added Reminder to Complete to Upcoming Primary Care Appt Notes     []  Patient Declined     []  Patient Will Call Back / Patient Will Send Portal Message with Reading     []  Patient Will Call Back to Schedule Provider Visit             []       HbA1c & Other Labs []  Lab Appt Scheduled for Due Labs     []  Primary Care Follow Up Visit Scheduled      []  Reminded Patient to Complete Home Test     []  Added Reminder to Complete to Upcoming Primary Care Appt Notes     []  Patient Declined     []  Patient Will Call Back to Schedule     []  Patient Will Schedule with External Provider / Order Routed if Applicable           []    Schedule Primary Care Appt []  Primary Care Appt Scheduled     []  Patient Declined     []  Patient Will Call Back to Schedule     []  Pt Established with External Provider & Updated Care Team             []      Medication Adherence []  Primary Care Appointment Scheduled     []  Added Reminder to Upcoming Primary Care Appt Notes     []  Patient Reminded to  Prescription     []  Patient Declined, Provider Notified if Needed     []  Sent Provider Message to Review and/or Add Exclusion to Problem List             []      Osteoporosis Screening []  DXA Appointment Scheduled     []  External Records Requested     []  Added Reminder to Complete to Upcoming Primary Care Appt Notes     []  Patient Declined     []  Patient Will Call Back to Schedule     []  Patient Will Schedule with External Provider / Order Routed if Applicable     Additional Care Coordinator Notes:         Further Action Needed If Patient Returns Outreach:

## 2023-06-16 ENCOUNTER — LAB VISIT (OUTPATIENT)
Dept: LAB | Facility: CLINIC | Age: 23
End: 2023-06-16
Payer: OTHER GOVERNMENT

## 2023-06-16 ENCOUNTER — OFFICE VISIT (OUTPATIENT)
Dept: OBSTETRICS AND GYNECOLOGY | Facility: CLINIC | Age: 23
End: 2023-06-16
Payer: OTHER GOVERNMENT

## 2023-06-16 VITALS
DIASTOLIC BLOOD PRESSURE: 68 MMHG | WEIGHT: 168 LBS | BODY MASS INDEX: 24.88 KG/M2 | HEIGHT: 69 IN | SYSTOLIC BLOOD PRESSURE: 108 MMHG | HEART RATE: 102 BPM

## 2023-06-16 DIAGNOSIS — Z11.3 SCREENING FOR STD (SEXUALLY TRANSMITTED DISEASE): ICD-10-CM

## 2023-06-16 DIAGNOSIS — Z01.419 WELL WOMAN EXAM WITH ROUTINE GYNECOLOGICAL EXAM: Primary | ICD-10-CM

## 2023-06-16 LAB
HAV IGM SERPL QL IA: NORMAL
HBV CORE IGM SERPL QL IA: NORMAL
HBV SURFACE AG SERPL QL IA: NORMAL
HCV AB SERPL QL IA: NORMAL
HIV 1+2 AB+HIV1 P24 AG SERPL QL IA: NORMAL

## 2023-06-16 PROCEDURE — 80074 ACUTE HEPATITIS PANEL: CPT | Performed by: STUDENT IN AN ORGANIZED HEALTH CARE EDUCATION/TRAINING PROGRAM

## 2023-06-16 PROCEDURE — 36415 PR COLLECTION VENOUS BLOOD,VENIPUNCTURE: ICD-10-PCS | Mod: ,,, | Performed by: STUDENT IN AN ORGANIZED HEALTH CARE EDUCATION/TRAINING PROGRAM

## 2023-06-16 PROCEDURE — 87591 N.GONORRHOEAE DNA AMP PROB: CPT | Performed by: STUDENT IN AN ORGANIZED HEALTH CARE EDUCATION/TRAINING PROGRAM

## 2023-06-16 PROCEDURE — 88175 CYTOPATH C/V AUTO FLUID REDO: CPT | Performed by: STUDENT IN AN ORGANIZED HEALTH CARE EDUCATION/TRAINING PROGRAM

## 2023-06-16 PROCEDURE — 86592 SYPHILIS TEST NON-TREP QUAL: CPT | Performed by: STUDENT IN AN ORGANIZED HEALTH CARE EDUCATION/TRAINING PROGRAM

## 2023-06-16 PROCEDURE — 36415 COLL VENOUS BLD VENIPUNCTURE: CPT | Mod: ,,, | Performed by: STUDENT IN AN ORGANIZED HEALTH CARE EDUCATION/TRAINING PROGRAM

## 2023-06-16 PROCEDURE — 87389 HIV-1 AG W/HIV-1&-2 AB AG IA: CPT | Performed by: STUDENT IN AN ORGANIZED HEALTH CARE EDUCATION/TRAINING PROGRAM

## 2023-06-16 PROCEDURE — 99395 PREV VISIT EST AGE 18-39: CPT | Mod: S$GLB,,, | Performed by: STUDENT IN AN ORGANIZED HEALTH CARE EDUCATION/TRAINING PROGRAM

## 2023-06-16 PROCEDURE — 81514 NFCT DS BV&VAGINITIS DNA ALG: CPT | Performed by: STUDENT IN AN ORGANIZED HEALTH CARE EDUCATION/TRAINING PROGRAM

## 2023-06-16 PROCEDURE — 99395 PR PREVENTIVE VISIT,EST,18-39: ICD-10-PCS | Mod: S$GLB,,, | Performed by: STUDENT IN AN ORGANIZED HEALTH CARE EDUCATION/TRAINING PROGRAM

## 2023-06-16 RX ORDER — DROSPIRENONE AND ETHINYL ESTRADIOL 0.03MG-3MG
1 KIT ORAL DAILY
Qty: 28 TABLET | Refills: 12 | Status: SHIPPED | OUTPATIENT
Start: 2023-06-16 | End: 2023-07-07

## 2023-06-16 RX ORDER — DROSPIRENONE AND ETHINYL ESTRADIOL 0.03MG-3MG
1 KIT ORAL DAILY
COMMUNITY
End: 2023-08-10

## 2023-06-16 NOTE — PROGRESS NOTES
Chief Complaint   Patient presents with    Well Woman     Patient is here for a well woman visit.        HPI:  23 y.o. female  presents for a well woman exam and to establish care. Patient started taking OCPs at age 18 for ovarian cysts. Patient lives in [place] and attends college at [employer].     - Patient's last menstrual period was 2023. Cycles are monthly, last 7-8 days, heavy bleeding.   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 No pertinent surgical history.    Social History     Tobacco Use    Smoking status: Every Day     Types: Vaping with nicotine     Passive exposure: Never    Smokeless tobacco: Never   Substance Use Topics    Alcohol use: Yes     Comment: Socially     Family History   Problem Relation Age of Onset    Cancer Mother     Hypertension Father     Hyperlipidemia Father     Diabetes Paternal Grandmother     Diabetes Paternal Grandfather     Breast cancer Maternal Grandmother     Colon cancer Neg Hx     Ovarian cancer Neg Hx      OB History    Para Term  AB Living   0 0 0 0 0 0   SAB IAB Ectopic Multiple Live Births   0 0 0 0 0       MEDICATIONS: Reviewed with patient.  ALLERGIES: Patient has no known allergies.     ROS:  Review of Systems   Constitutional:  Negative for chills and fever.   Eyes:  Negative for visual disturbance.   Respiratory:  Negative for shortness of breath.    Cardiovascular:  Negative for  "chest pain.   Gastrointestinal:  Negative for abdominal pain, nausea and vomiting.   Endocrine: Negative for diabetes.   Genitourinary:  Negative for dysuria, pelvic pain, vaginal bleeding and vaginal discharge.   Musculoskeletal:  Negative for back pain.   Integumentary:  Negative for rash, breast mass, nipple discharge and breast skin changes.   Neurological:  Negative for headaches.   Breast: Negative for mass, mastodynia, nipple discharge and skin changes    PHYSICAL EXAM:    /68   Pulse 102   Ht 5' 9" (1.753 m)   Wt 76.2 kg (168 lb)   LMP 06/02/2023   BMI 24.81 kg/m²     Physical Exam:   Constitutional: She is oriented to person, place, and time. She appears well-developed. No distress.    HENT:   Head: Normocephalic.      Cardiovascular:  Normal rate.             Pulmonary/Chest: Effort normal. No respiratory distress. Right breast exhibits no mass, no nipple discharge, no skin change, no tenderness and no swelling. Left breast exhibits no mass, no nipple discharge, no skin change, no tenderness and no swelling.        Abdominal: Soft. She exhibits no mass. There is no abdominal tenderness.     Genitourinary:    Vagina, uterus, right adnexa and left adnexa normal.      Pelvic exam was performed with patient supine.   There is no rash or tenderness on the right labia. There is no rash or tenderness on the left labia. Cervix is normal. Right adnexum displays no tenderness and no fullness. Left adnexum displays no tenderness and no fullness. No  no vaginal discharge, tenderness or bleeding in the vagina. Cervix exhibits no motion tenderness, no discharge and no friability. Uterus is not enlarged and not tender.    Genitourinary Comments: External genitalia: normal  Urethra: Non-tender, no masses  Urethral meatus: normal  Bladder: Non-tender, no masses             Musculoskeletal: Normal range of motion. No tenderness.       Neurological: She is alert and oriented to person, place, and time.    Skin: " Skin is warm and dry.    Psychiatric: She has a normal mood and affect.       ASSESSMENT & PLAN:   Well woman exam with routine gynecological exam  - Breast and pelvic exam: WNL  - Patient was counseled on ASCCP guidelines for cervical cytology screening  - Cervical screening: done today  - STD testing: done today  - Contraception: refill for OCPs provided    -     Liquid-Based Pap Smear, Screening  -     drospirenone-ethinyl estradioL (RAFFI) 3-0.03 mg per tablet; Take 1 tablet by mouth once daily.  Dispense: 28 tablet; Refill: 12    Screening for STD (sexually transmitted disease)  -     C. trachomatis/N. gonorrhoeae by AMP DNA [facility]; Cervix  -     Hepatitis Panel, Acute; Future; Expected date: 06/16/2023  -     HIV 1/2 Ag/Ab (4th Gen); Future; Expected date: 06/16/2023  -     RPR; Future; Expected date: 06/16/2023  -     Vaginosis Screen by DNA Probe    Return to clinic in 1 year for annual exam.     Diamond Evans MD  OB/GYN

## 2023-06-17 LAB — RPR SER QL: NORMAL

## 2023-06-19 ENCOUNTER — PATIENT MESSAGE (OUTPATIENT)
Dept: OBSTETRICS AND GYNECOLOGY | Facility: CLINIC | Age: 23
End: 2023-06-19
Payer: OTHER GOVERNMENT

## 2023-06-19 LAB
BACTERIAL VAGINOSIS DNA: NEGATIVE
C TRACH DNA SPEC QL NAA+PROBE: NOT DETECTED
CANDIDA GLABRATA DNA: NEGATIVE
CANDIDA KRUSEI DNA: NEGATIVE
CANDIDA RRNA VAG QL PROBE: POSITIVE
N GONORRHOEA DNA SPEC QL NAA+PROBE: NOT DETECTED
T VAGINALIS RRNA GENITAL QL PROBE: NEGATIVE

## 2023-06-20 ENCOUNTER — PATIENT MESSAGE (OUTPATIENT)
Dept: OBSTETRICS AND GYNECOLOGY | Facility: CLINIC | Age: 23
End: 2023-06-20
Payer: OTHER GOVERNMENT

## 2023-06-21 DIAGNOSIS — B37.31 VAGINAL YEAST INFECTION: Primary | ICD-10-CM

## 2023-06-21 RX ORDER — FLUCONAZOLE 150 MG/1
150 TABLET ORAL DAILY
Qty: 1 TABLET | Refills: 1 | Status: SHIPPED | OUTPATIENT
Start: 2023-06-21 | End: 2023-06-22

## 2023-06-23 LAB
FINAL PATHOLOGIC DIAGNOSIS: NORMAL
Lab: NORMAL

## 2023-07-07 ENCOUNTER — TELEPHONE (OUTPATIENT)
Dept: OTOLARYNGOLOGY | Facility: CLINIC | Age: 23
End: 2023-07-07

## 2023-07-07 ENCOUNTER — OFFICE VISIT (OUTPATIENT)
Dept: OTOLARYNGOLOGY | Facility: CLINIC | Age: 23
End: 2023-07-07
Payer: OTHER GOVERNMENT

## 2023-07-07 ENCOUNTER — PATIENT MESSAGE (OUTPATIENT)
Dept: OTOLARYNGOLOGY | Facility: CLINIC | Age: 23
End: 2023-07-07

## 2023-07-07 VITALS — WEIGHT: 166 LBS | BODY MASS INDEX: 24.59 KG/M2 | TEMPERATURE: 97 F | HEIGHT: 69 IN

## 2023-07-07 DIAGNOSIS — J03.91 RECURRENT TONSILLITIS: Primary | ICD-10-CM

## 2023-07-07 DIAGNOSIS — J03.91 RECURRENT TONSILLITIS: ICD-10-CM

## 2023-07-07 DIAGNOSIS — J35.8 TONSIL STONE: Primary | ICD-10-CM

## 2023-07-07 PROCEDURE — 99204 OFFICE O/P NEW MOD 45 MIN: CPT | Mod: S$PBB,,, | Performed by: PHYSICIAN ASSISTANT

## 2023-07-07 PROCEDURE — 99204 PR OFFICE/OUTPT VISIT, NEW, LEVL IV, 45-59 MIN: ICD-10-PCS | Mod: S$PBB,,, | Performed by: PHYSICIAN ASSISTANT

## 2023-07-07 PROCEDURE — 99999 PR PBB SHADOW E&M-EST. PATIENT-LVL III: ICD-10-PCS | Mod: PBBFAC,,, | Performed by: PHYSICIAN ASSISTANT

## 2023-07-07 PROCEDURE — 99999 PR PBB SHADOW E&M-EST. PATIENT-LVL III: CPT | Mod: PBBFAC,,, | Performed by: PHYSICIAN ASSISTANT

## 2023-07-07 PROCEDURE — 99213 OFFICE O/P EST LOW 20 MIN: CPT | Mod: PBBFAC | Performed by: PHYSICIAN ASSISTANT

## 2023-07-07 NOTE — PROGRESS NOTES
Subjective     Patient ID: Annmarie Chino is a 23 y.o. female.    Chief Complaint: tonsil stones (Pt states she has been having tonsil stones since high school. States can constantly see white film, etc on tonsils)    Patient is a pleasant 23 year old female here to see me today for the first time for evaluation of tonsil stones and recurrent tonsillitis.  She reports getting tonsil stones over past 4-5 years.  She often has throat pain associated with them and has had to miss school and work due to throat pain.  She has been seen at  several times as well and diagnosed with tonsillitis and treated with oral antibiotics (at least three times in past 12 months).  She denies issues with recurrent strep pharyngitis.  She sometimes has associated ear pain, odynophagia and lymphadenopathy.  No fever now and no pain currently.  She states she is usually able to push out the tonsil stones with toothbrush.  She has noticed white film on tonsils at times as well (was worse when she vaped).  She does not smoke or vape currently.  Denies reflux.  She denies change in voice.  She is ready to proceed with tonsillectomy but states her schedule is limited due to travel and school.    Review of Systems   Constitutional: Negative.  Negative for fever.   HENT:  Positive for sore throat. Negative for nasal congestion, ear discharge, ear pain, hearing loss, trouble swallowing and voice change.    Eyes: Negative.    Respiratory: Negative.  Negative for cough and shortness of breath.    Cardiovascular: Negative.    Gastrointestinal: Negative.    Endocrine: Negative.    Genitourinary: Negative.    Musculoskeletal: Negative.    Integumentary:  Negative.   Allergic/Immunologic: Negative.    Neurological: Negative.    Hematological: Negative.    Psychiatric/Behavioral: Negative.          Objective     Physical Exam  Constitutional:       General: She is not in acute distress.     Appearance: She is well-developed.   HENT:       Head: Normocephalic and atraumatic.      Right Ear: Hearing, tympanic membrane, ear canal and external ear normal. No middle ear effusion. There is impacted cerumen (small amount, not occlusive). Tympanic membrane is not erythematous.      Left Ear: Hearing, tympanic membrane, ear canal and external ear normal.  No middle ear effusion. Tympanic membrane is not erythematous.      Nose: Nose normal. No mucosal edema or rhinorrhea.      Right Sinus: No maxillary sinus tenderness or frontal sinus tenderness.      Left Sinus: No maxillary sinus tenderness or frontal sinus tenderness.      Mouth/Throat:      Mouth: Mucous membranes are moist. Mucous membranes are not pale and not dry.      Dentition: Normal dentition.      Pharynx: Uvula midline. No oropharyngeal exudate or posterior oropharyngeal erythema.      Tonsils: 3+ on the right. 3+ on the left.      Comments: Cryptic tonsils with few tiny scattered tonsilloliths on right tonsil  Eyes:      General: Lids are normal. No scleral icterus.     Extraocular Movements:      Right eye: Normal extraocular motion and no nystagmus.      Left eye: Normal extraocular motion and no nystagmus.      Conjunctiva/sclera: Conjunctivae normal.      Right eye: Right conjunctiva is not injected. No chemosis.     Left eye: Left conjunctiva is not injected. No chemosis.     Pupils: Pupils are equal, round, and reactive to light.   Neck:      Trachea: Trachea and phonation normal. No tracheal tenderness or tracheal deviation.   Pulmonary:      Effort: Pulmonary effort is normal. No respiratory distress.      Breath sounds: No stridor.   Lymphadenopathy:      Head:      Right side of head: No preauricular or posterior auricular adenopathy.      Left side of head: No preauricular or posterior auricular adenopathy.      Cervical: No cervical adenopathy.   Skin:     General: Skin is warm and dry.      Findings: No erythema or rash.   Neurological:      Mental Status: She is alert and oriented  "to person, place, and time.      Cranial Nerves: No cranial nerve deficit.   Psychiatric:         Behavior: Behavior normal. Behavior is cooperative.          Assessment and Plan     1. Tonsil stone    2. Recurrent tonsillitis        Discussed that the patient has tonsil stones, or tonsilloliths.  Some patients complain of small stones on their tongue or tonsils when they cough that have a foul odor. These stones are "tonsilloliths" that form in crypts of the tonsils. They are relatively common, occurring in perhaps 7 percent of the population at one time or another.   Although the stones have a foul smell, they do not always cause bad breath. I would recommend treatment with hypertonic saline gargles and a water pick, I would not recommend manual extraction of the tonsil stones or putting sharp objects in their mouth to remove them.  A tonsillectomy is a potential option if the stones become recurrent, cause pain, or are associated with recurrent tonsil infections.  Patient wants to proceed with tonsillectomy but states her schedule is very limited with travel and school.  Discussed that post-operative PAIN is a major factor and most adult patients require narcotics anywhere from 3-10 days.      Discussed tonsillectomy, risks and benefits, including a 1% risk of postoperative bleeding.  Patient voices understanding and agrees to proceed. She would like to see if there is an ENT closer to her parents' home in [place], [place].  If not, she wants to proceed with tonsillectomy here on 8/16/23.  The patient will follow up with me 2-3 weeks after surgery if scheduled in [place].  No need for antibiotics at this time.             No follow-ups on file.    "

## 2023-07-20 ENCOUNTER — TELEPHONE (OUTPATIENT)
Dept: OTOLARYNGOLOGY | Facility: CLINIC | Age: 23
End: 2023-07-20
Payer: OTHER GOVERNMENT

## 2023-07-20 NOTE — TELEPHONE ENCOUNTER
----- Message from Delilah Mcmahon sent at 7/20/2023  3:01 PM CDT -----  Contact: dejah  Patient is requesting a call to reschedule procedure due to call she received about provider being out that day. Please call her back at [phone].        Thanks  DD

## 2023-07-20 NOTE — TELEPHONE ENCOUNTER
Pt called regarding surgery.  She only had like a one week window between summer and fall classes that she was free to do surgery.  Advised pt that one MD is out on leave and another is booking out till September.  She wants to cancel for now.  May look for an [facility] MD in [place] near school.  OR  has already left for the day.  Please call on Friday 7/21/23 and request they place case request in the depot.

## 2023-08-10 ENCOUNTER — OFFICE VISIT (OUTPATIENT)
Dept: FAMILY MEDICINE | Facility: CLINIC | Age: 23
End: 2023-08-10
Payer: OTHER GOVERNMENT

## 2023-08-10 ENCOUNTER — PATIENT MESSAGE (OUTPATIENT)
Dept: FAMILY MEDICINE | Facility: CLINIC | Age: 23
End: 2023-08-10
Payer: OTHER GOVERNMENT

## 2023-08-10 VITALS
OXYGEN SATURATION: 98 % | SYSTOLIC BLOOD PRESSURE: 100 MMHG | HEART RATE: 83 BPM | WEIGHT: 158.94 LBS | DIASTOLIC BLOOD PRESSURE: 66 MMHG | HEIGHT: 69 IN | BODY MASS INDEX: 23.54 KG/M2

## 2023-08-10 DIAGNOSIS — L65.9 HAIR LOSS: ICD-10-CM

## 2023-08-10 DIAGNOSIS — R23.3 EASY BRUISING: Primary | ICD-10-CM

## 2023-08-10 LAB
ALBUMIN SERPL BCP-MCNC: 3.8 G/DL (ref 3.5–5.2)
ALP SERPL-CCNC: 45 U/L (ref 55–135)
ALT SERPL W/O P-5'-P-CCNC: 10 U/L (ref 10–44)
ANION GAP SERPL CALC-SCNC: 12 MMOL/L (ref 8–16)
AST SERPL-CCNC: 14 U/L (ref 10–40)
BASOPHILS # BLD AUTO: 0.03 K/UL (ref 0–0.2)
BASOPHILS NFR BLD: 0.7 % (ref 0–1.9)
BILIRUB SERPL-MCNC: 0.4 MG/DL (ref 0.1–1)
BUN SERPL-MCNC: 8 MG/DL (ref 6–20)
CALCIUM SERPL-MCNC: 9.9 MG/DL (ref 8.7–10.5)
CHLORIDE SERPL-SCNC: 105 MMOL/L (ref 95–110)
CO2 SERPL-SCNC: 22 MMOL/L (ref 23–29)
CREAT SERPL-MCNC: 0.9 MG/DL (ref 0.5–1.4)
DIFFERENTIAL METHOD: ABNORMAL
EOSINOPHIL # BLD AUTO: 0.1 K/UL (ref 0–0.5)
EOSINOPHIL NFR BLD: 2 % (ref 0–8)
ERYTHROCYTE [DISTWIDTH] IN BLOOD BY AUTOMATED COUNT: 15.9 % (ref 11.5–14.5)
EST. GFR  (NO RACE VARIABLE): >60 ML/MIN/1.73 M^2
GLUCOSE SERPL-MCNC: 73 MG/DL (ref 70–110)
HCT VFR BLD AUTO: 39.5 % (ref 37–48.5)
HGB BLD-MCNC: 12.4 G/DL (ref 12–16)
IMM GRANULOCYTES # BLD AUTO: 0.01 K/UL (ref 0–0.04)
IMM GRANULOCYTES NFR BLD AUTO: 0.2 % (ref 0–0.5)
LYMPHOCYTES # BLD AUTO: 1.5 K/UL (ref 1–4.8)
LYMPHOCYTES NFR BLD: 36.6 % (ref 18–48)
MCH RBC QN AUTO: 25.9 PG (ref 27–31)
MCHC RBC AUTO-ENTMCNC: 31.4 G/DL (ref 32–36)
MCV RBC AUTO: 83 FL (ref 82–98)
MONOCYTES # BLD AUTO: 0.5 K/UL (ref 0.3–1)
MONOCYTES NFR BLD: 11.5 % (ref 4–15)
NEUTROPHILS # BLD AUTO: 2 K/UL (ref 1.8–7.7)
NEUTROPHILS NFR BLD: 49 % (ref 38–73)
NRBC BLD-RTO: 0 /100 WBC
PLATELET # BLD AUTO: 250 K/UL (ref 150–450)
PMV BLD AUTO: 11.4 FL (ref 9.2–12.9)
POTASSIUM SERPL-SCNC: 4 MMOL/L (ref 3.5–5.1)
PROT SERPL-MCNC: 7.2 G/DL (ref 6–8.4)
RBC # BLD AUTO: 4.79 M/UL (ref 4–5.4)
SODIUM SERPL-SCNC: 139 MMOL/L (ref 136–145)
TSH SERPL DL<=0.005 MIU/L-ACNC: 2.8 UIU/ML (ref 0.4–4)
WBC # BLD AUTO: 4.1 K/UL (ref 3.9–12.7)

## 2023-08-10 PROCEDURE — 99214 PR OFFICE/OUTPT VISIT, EST, LEVL IV, 30-39 MIN: ICD-10-PCS | Mod: S$PBB,,, | Performed by: FAMILY MEDICINE

## 2023-08-10 PROCEDURE — 82607 VITAMIN B-12: CPT | Performed by: FAMILY MEDICINE

## 2023-08-10 PROCEDURE — 99213 OFFICE O/P EST LOW 20 MIN: CPT | Mod: PBBFAC,PN | Performed by: FAMILY MEDICINE

## 2023-08-10 PROCEDURE — 99999 PR PBB SHADOW E&M-EST. PATIENT-LVL III: ICD-10-PCS | Mod: PBBFAC,,, | Performed by: FAMILY MEDICINE

## 2023-08-10 PROCEDURE — 85025 COMPLETE CBC W/AUTO DIFF WBC: CPT | Performed by: FAMILY MEDICINE

## 2023-08-10 PROCEDURE — 80053 COMPREHEN METABOLIC PANEL: CPT | Performed by: FAMILY MEDICINE

## 2023-08-10 PROCEDURE — 99999 PR PBB SHADOW E&M-EST. PATIENT-LVL III: CPT | Mod: PBBFAC,,, | Performed by: FAMILY MEDICINE

## 2023-08-10 PROCEDURE — 99214 OFFICE O/P EST MOD 30 MIN: CPT | Mod: S$PBB,,, | Performed by: FAMILY MEDICINE

## 2023-08-10 PROCEDURE — 84443 ASSAY THYROID STIM HORMONE: CPT | Performed by: FAMILY MEDICINE

## 2023-08-10 RX ORDER — DROSPIRENONE AND ETHINYL ESTRADIOL 0.03MG-3MG
1 KIT ORAL DAILY
Qty: 90 TABLET | Refills: 1 | Status: SHIPPED | OUTPATIENT
Start: 2023-08-10 | End: 2023-11-10

## 2023-08-10 RX ORDER — DROSPIRENONE AND ETHINYL ESTRADIOL 0.03MG-3MG
1 KIT ORAL DAILY
COMMUNITY
End: 2023-08-10 | Stop reason: SDUPTHER

## 2023-08-10 NOTE — PROGRESS NOTES
Subjective     Patient ID: Annmarie Chino is a 23 y.o. female.    Chief Complaint: Bruising easily x3M , Hair loss x a few months, and Medication Refill    Urgent care visit    Pt home from college until the 18th.  States she has noticed bruising easily the 3 months.  States when she sucks her gums she gets this blood in her mouth sense of taste.  Patient denies any bleeding of her gums with teeth brushing or while eating.  States she has noticed hair loss for at least a year.  Patient here for labs.  Patient also states she would like her vitamin-D tested.    Contraception Management:  Patient l requesting refill on her birth control pill      Review of Systems   Constitutional:  Negative for activity change, appetite change, chills, diaphoresis, fatigue, fever and unexpected weight change.   Respiratory:  Negative for cough, choking and chest tightness.    Cardiovascular:  Negative for chest pain, palpitations, leg swelling and claudication.   Gastrointestinal:  Negative for abdominal pain, change in bowel habit, constipation, diarrhea, nausea, vomiting and change in bowel habit.   Hematological:  Bruises/bleeds easily.   Psychiatric/Behavioral:  Negative for dysphoric mood, self-injury, sleep disturbance and suicidal ideas. The patient is not nervous/anxious.           Objective     Physical Exam  Vitals reviewed.   Constitutional:       General: She is not in acute distress.     Appearance: Normal appearance. She is normal weight. She is not ill-appearing or toxic-appearing.   HENT:      Mouth/Throat:      Mouth: Mucous membranes are moist.      Pharynx: No oropharyngeal exudate or posterior oropharyngeal erythema.   Cardiovascular:      Rate and Rhythm: Normal rate.   Pulmonary:      Effort: Pulmonary effort is normal. No respiratory distress.   Skin:     Findings: No bruising.   Neurological:      General: No focal deficit present.      Mental Status: She is alert and oriented to person, place, and  time.   Psychiatric:         Mood and Affect: Mood normal.         Behavior: Behavior normal.            Assessment and Plan     1. Easy bruising  -     Comprehensive Metabolic Panel; Future; Expected date: 08/10/2023  -     CBC Auto Differential; Future; Expected date: 08/10/2023  -     TSH; Future; Expected date: 08/10/2023  -     Vitamin B12; Future; Expected date: 08/10/2023    2. Hair loss  -     TSH; Future; Expected date: 08/10/2023    Other orders  -     drospirenone-ethinyl estradioL (RAFFI) 3-0.03 mg per tablet; Take 1 tablet by mouth once daily.  Dispense: 90 tablet; Refill: 1      Will check labs today.  Advised patient that I am not able to order vitamin-D.  Advised that she can reach out to her PCP to see if she has the capability to order that lab.  Refilled birth control pill. All questions were answered to the fullest satisfaction of the patient, and pt verbalized understanding and agreement to treatment plan. Pt was to call her PCP with any new or worsening symptoms, or present to the ER.         Susy Henderson MD  Family Medicine Physician   [facility]     This note was created using [employer] voice recognition software that occasionally may misinterpret phrases or words.

## 2023-08-11 LAB — VIT B12 SERPL-MCNC: 614 PG/ML (ref 210–950)

## 2023-08-16 ENCOUNTER — PATIENT MESSAGE (OUTPATIENT)
Dept: OBSTETRICS AND GYNECOLOGY | Facility: CLINIC | Age: 23
End: 2023-08-16
Payer: OTHER GOVERNMENT

## 2023-08-18 DIAGNOSIS — B37.31 VAGINAL YEAST INFECTION: Primary | ICD-10-CM

## 2023-08-18 RX ORDER — FLUCONAZOLE 150 MG/1
150 TABLET ORAL DAILY
Qty: 1 TABLET | Refills: 1 | Status: SHIPPED | OUTPATIENT
Start: 2023-08-18 | End: 2023-08-19

## 2023-09-15 ENCOUNTER — OFFICE VISIT (OUTPATIENT)
Dept: OBSTETRICS AND GYNECOLOGY | Facility: CLINIC | Age: 23
End: 2023-09-15
Payer: OTHER GOVERNMENT

## 2023-09-15 VITALS
WEIGHT: 157 LBS | BODY MASS INDEX: 23.25 KG/M2 | HEIGHT: 69 IN | SYSTOLIC BLOOD PRESSURE: 96 MMHG | HEART RATE: 61 BPM | DIASTOLIC BLOOD PRESSURE: 62 MMHG

## 2023-09-15 DIAGNOSIS — N89.8 VAGINAL DISCHARGE: Primary | ICD-10-CM

## 2023-09-15 DIAGNOSIS — B37.31 RECURRENT CANDIDIASIS OF VAGINA: ICD-10-CM

## 2023-09-15 PROCEDURE — 99213 PR OFFICE/OUTPT VISIT, EST, LEVL III, 20-29 MIN: ICD-10-PCS | Mod: S$GLB,,, | Performed by: STUDENT IN AN ORGANIZED HEALTH CARE EDUCATION/TRAINING PROGRAM

## 2023-09-15 PROCEDURE — 99213 OFFICE O/P EST LOW 20 MIN: CPT | Mod: S$GLB,,, | Performed by: STUDENT IN AN ORGANIZED HEALTH CARE EDUCATION/TRAINING PROGRAM

## 2023-09-15 PROCEDURE — 81514 NFCT DS BV&VAGINITIS DNA ALG: CPT | Performed by: STUDENT IN AN ORGANIZED HEALTH CARE EDUCATION/TRAINING PROGRAM

## 2023-09-15 RX ORDER — CLOTRIMAZOLE 1 %
CREAM (GRAM) TOPICAL NIGHTLY
Qty: 28 G | Refills: 4 | Status: SHIPPED | OUTPATIENT
Start: 2023-09-15 | End: 2023-11-10

## 2023-09-15 RX ORDER — NYSTATIN 100000 U/G
CREAM TOPICAL 2 TIMES DAILY PRN
Qty: 15 G | Refills: 0 | Status: SHIPPED | OUTPATIENT
Start: 2023-09-15 | End: 2023-11-10

## 2023-09-15 NOTE — PROGRESS NOTES
Chief Complaint   Patient presents with    Vaginal Discharge     Patient states she has a vaginal discharge.        HPI:  23 y.o. female  presents for evaluation of vaginal discharge and recurrent vaginal yeast infections. Patient reports thick, white discharge and itching several times throughout the year. She is consistently positive for yeast infections. She states that she doesn't feel that her symptoms completely resolve with diflucan. She notices that her symptoms worsen around the time of her period. She also reports itching on perineum.    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 No pertinent surgical history.    Social History     Tobacco Use    Smoking status: Former     Types: Vaping with nicotine     Passive exposure: Never    Smokeless tobacco: Never   Substance Use Topics    Alcohol use: Yes     Comment: Socially     Family History   Problem Relation Age of Onset    Cancer Mother     Hypertension Father     Hyperlipidemia Father     Diabetes Paternal Grandmother     Diabetes Paternal Grandfather     Breast cancer Maternal Grandmother     Colon cancer Neg Hx     Ovarian cancer Neg Hx      OB History    Para Term  AB Living   0 0 0 0 0 0   SAB IAB Ectopic Multiple Live Births   0 0 0 0 0       MEDICATIONS: Reviewed with patient.  ALLERGIES: Patient has no known allergies.     ROS:  Review of Systems   Constitutional:  Negative for fever.   Respiratory:  Negative for shortness of breath.    Cardiovascular:  Negative for chest pain.   Gastrointestinal:  Negative for abdominal pain, nausea and vomiting.   Genitourinary:  Positive for vaginal discharge and vaginal pain (itching). Negative for menstrual problem and pelvic pain.   Neurological:   "Negative for headaches.       PHYSICAL EXAM:    BP 96/62   Pulse 61   Ht 5' 9" (1.753 m)   Wt 71.2 kg (157 lb)   BMI 23.18 kg/m²     Physical Exam:   Constitutional: She is oriented to person, place, and time. She appears well-developed and well-nourished. No distress.    HENT:   Head: Normocephalic.      Cardiovascular:  Normal rate.             Pulmonary/Chest: Effort normal. No respiratory distress.        Abdominal: Soft. She exhibits no mass. There is no abdominal tenderness.     Genitourinary:    Uterus, right adnexa and left adnexa normal.      Pelvic exam was performed with patient supine.   There is no rash or tenderness on the right labia. There is no rash or tenderness on the left labia. Cervix is normal. Right adnexum displays no tenderness and no fullness. Left adnexum displays no tenderness and no fullness. There is vaginal discharge (large amount of thick white discharge adherent to walls and cervix) in the vagina. No tenderness or bleeding in the vagina. Uterus is not enlarged and not tender.    Genitourinary Comments: External genitalia: normal  Urethra: Non-tender, no masses  Urethral meatus: normal  Bladder: Non-tender, no masses             Musculoskeletal: Normal range of motion. No tenderness.       Neurological: She is alert and oriented to person, place, and time.    Skin: Skin is warm and dry.    Psychiatric: She has a normal mood and affect.         ASSESSMENT & PLAN:   Vaginal discharge  -     Vaginosis Screen by DNA Probe  -     Exam consistent with vaginal yeast infection  -     Discussed clomitrazole nightly x 10 days and then weekly x 6 months  -     Nystatin sent for external itching/cutaneous candidiasis    Recurrent candidiasis of vagina  -     clotrimazole (LOTRIMIN) 1 % cream; Apply topically nightly. Place vaginally for 10 days, then weekly for 6 months.  Dispense: 28 g; Refill: 4  -     Discussed lifestyle changes, including using Dove unscented soap    Other orders  -     " nystatin (MYCOSTATIN) cream; Apply topically 2 (two) times daily as needed for Dry Skin (itching).  Dispense: 15 g; Refill: 0      Return to clinic as needed.     Diamond Evans MD  OB/GYN

## 2023-09-18 ENCOUNTER — PATIENT MESSAGE (OUTPATIENT)
Dept: OBSTETRICS AND GYNECOLOGY | Facility: CLINIC | Age: 23
End: 2023-09-18
Payer: OTHER GOVERNMENT

## 2023-09-18 LAB
BACTERIAL VAGINOSIS DNA: NEGATIVE
CANDIDA GLABRATA DNA: NEGATIVE
CANDIDA KRUSEI DNA: NEGATIVE
CANDIDA RRNA VAG QL PROBE: NEGATIVE
T VAGINALIS RRNA GENITAL QL PROBE: NEGATIVE

## 2023-10-19 ENCOUNTER — PATIENT MESSAGE (OUTPATIENT)
Dept: OBSTETRICS AND GYNECOLOGY | Facility: CLINIC | Age: 23
End: 2023-10-19
Payer: OTHER GOVERNMENT

## 2023-10-23 DIAGNOSIS — N92.1 BREAKTHROUGH BLEEDING ON OCPS: Primary | ICD-10-CM

## 2023-10-23 RX ORDER — NORGESTIMATE AND ETHINYL ESTRADIOL 0.25-0.035
1 KIT ORAL DAILY
Qty: 30 TABLET | Refills: 11 | Status: SHIPPED | OUTPATIENT
Start: 2023-10-23 | End: 2023-11-10

## 2023-10-24 ENCOUNTER — OFFICE VISIT (OUTPATIENT)
Dept: URGENT CARE | Facility: CLINIC | Age: 23
End: 2023-10-24
Payer: OTHER GOVERNMENT

## 2023-10-24 VITALS
HEART RATE: 99 BPM | HEIGHT: 69 IN | TEMPERATURE: 98 F | OXYGEN SATURATION: 99 % | DIASTOLIC BLOOD PRESSURE: 62 MMHG | BODY MASS INDEX: 23.25 KG/M2 | SYSTOLIC BLOOD PRESSURE: 98 MMHG | WEIGHT: 157 LBS | RESPIRATION RATE: 20 BRPM

## 2023-10-24 DIAGNOSIS — J03.90 EXUDATIVE TONSILLITIS: Primary | ICD-10-CM

## 2023-10-24 PROCEDURE — 99213 PR OFFICE/OUTPT VISIT, EST, LEVL III, 20-29 MIN: ICD-10-PCS | Mod: S$GLB,,, | Performed by: NURSE PRACTITIONER

## 2023-10-24 PROCEDURE — 99213 OFFICE O/P EST LOW 20 MIN: CPT | Mod: S$GLB,,, | Performed by: NURSE PRACTITIONER

## 2023-10-24 RX ORDER — AMOXICILLIN 875 MG/1
875 TABLET, FILM COATED ORAL 2 TIMES DAILY
COMMUNITY
Start: 2023-10-16 | End: 2023-11-10

## 2023-10-24 NOTE — PROGRESS NOTES
"Subjective:      Patient ID: Annmarie Chino is a 23 y.o. female.    Vitals:  height is 5' 9" (1.753 m) and weight is 71.2 kg (157 lb). Her tympanic temperature is 98 °F (36.7 °C). Her blood pressure is 98/62 and her pulse is 99. Her respiration is 20 and oxygen saturation is 99%.     Chief Complaint: Sore Throat (Pt stated sore throat, and congestion  x3 days. Pt stated symptoms are improving.)    Sore Throat   The current episode started 1 to 4 weeks ago. The problem has been unchanged. There has been no fever. The pain is at a severity of 0/10. The patient is experiencing no pain. Associated symptoms include congestion, coughing and vomiting. Pertinent negatives include no diarrhea, ear pain, headaches or plugged ear sensation. She has had no exposure to strep or mono. She has tried NSAIDs for the symptoms.       Constitution: Negative for chills, sweating, fatigue and fever.   HENT:  Positive for congestion and sore throat. Negative for ear pain.    Respiratory:  Positive for cough.    Gastrointestinal:  Positive for vomiting. Negative for nausea and diarrhea.   Neurological:  Negative for headaches.      Objective:     Physical Exam   Constitutional: She appears well-developed. She is cooperative.  Non-toxic appearance. She does not appear ill. No distress. normal and well-groomedawake  HENT:   Head: Normocephalic and atraumatic.   Ears:   Right Ear: Tympanic membrane, external ear and ear canal normal. impacted cerumen  Left Ear: Tympanic membrane, external ear and ear canal normal. impacted cerumen  Nose: Nose normal. No rhinorrhea or congestion.   Mouth/Throat: Mucous membranes are normal. Oropharyngeal exudate present. No posterior oropharyngeal edema, posterior oropharyngeal erythema, tonsillar abscesses or cobblestoning. Tonsils are 1+ on the right. Tonsils are 0 on the left. Tonsillar exudate (2 tonsil stones).   Eyes: Conjunctivae and EOM are normal.   Neck: Neck supple.   Cardiovascular: " Normal rate and regular rhythm.   Pulmonary/Chest: Effort normal and breath sounds normal.   Abdominal: Normal appearance.   Musculoskeletal: Normal range of motion.         General: Normal range of motion.   Neurological: no focal deficit. She is alert. She displays no weakness. Gait normal.   Skin: Skin is warm, dry, not diaphoretic, not pale and no rash.   Psychiatric: Her behavior is normal.   Nursing note and vitals reviewed.      Assessment:     1. Exudative tonsillitis        Plan:       Exudative tonsillitis        Theres no specific treatment for viral tonsillitis, but you can help reduce symptoms by:    getting plenty of rest  staying hydrated by drinking enough water  taking pain-relieving medication, such as acetaminophen (Tylenol) or ibuprofen (Advil, Motrin)  gargling a saltwater solution  using a humidifier  eating and drinking warm or cold liquids, such as broths, teas, or popsicles  sucking on throat lozenges      If symptoms persists or worsen, RTC, follow up with PCP, or go to the nearest ER  Patient agreed with plan of care and verbalized understanding      If you have been discharged from the clinic prior to your point of care test results being completed, please make sure to check your [facility] account.  If there is a change in treatment, we will communicate with you through here.  If your test is positive, and medications are ordered, these will be sent to your preferred pharmacy.   If your test is negative, no further steps needed. If you do not hear from us or have questions, please call the clinic.        - You must understand that you have received an Urgent Care treatment only and that you may be released before all of your medical problems are known or treated.   - You, the patient, will arrange for follow up care as instructed with your primary care provider or recommended specialist.   - If your condition worsens or fails to improve we recommend that you receive another evaluation at  the ER immediately or contact your PCP to discuss your concerns, or return here.   - Please do not drive or make any important decisions for 24 hours if you have received any pain medications, sedatives or mood altering drugs during your visit.     Disclaimer: This document was drafted with the use of a voice recognition device and is likely to have sound alike errors.

## 2023-10-24 NOTE — LETTER
October 24, 2023      [facility] [address] [place] [address]  Phone: [phone]  Fax: [phone]       Patient: Annmarie Chino   YOB: 2000  Date of Visit: 10/24/2023    To Whom It May Concern:    Mar Chino  was at [facility] on 10/24/2023. The patient may return to work/school on 10/25/2023 with no restrictions. If you have any questions or concerns, or if I can be of further assistance, please do not hesitate to contact me.    Sincerely,    Roxanne Koch NP

## 2023-10-24 NOTE — PATIENT INSTRUCTIONS
Theres no specific treatment for viral tonsillitis, but you can help reduce symptoms by:    getting plenty of rest  staying hydrated by drinking enough water  taking pain-relieving medication, such as acetaminophen (Tylenol) or ibuprofen (Advil, Motrin)  gargling a saltwater solution  using a humidifier  eating and drinking warm or cold liquids, such as broths, teas, or popsicles  sucking on throat lozenges      If symptoms persists or worsen, RTC, follow up with PCP, or go to the nearest ER  Patient agreed with plan of care and verbalized understanding      If you have been discharged from the clinic prior to your point of care test results being completed, please make sure to check your [facility] account.  If there is a change in treatment, we will communicate with you through here.  If your test is positive, and medications are ordered, these will be sent to your preferred pharmacy.   If your test is negative, no further steps needed. If you do not hear from us or have questions, please call the clinic.        - You must understand that you have received an Urgent Care treatment only and that you may be released before all of your medical problems are known or treated.   - You, the patient, will arrange for follow up care as instructed with your primary care provider or recommended specialist.   - If your condition worsens or fails to improve we recommend that you receive another evaluation at the ER immediately or contact your PCP to discuss your concerns, or return here.   - Please do not drive or make any important decisions for 24 hours if you have received any pain medications, sedatives or mood altering drugs during your visit.     Disclaimer: This document was drafted with the use of a voice recognition device and is likely to have sound alike errors.

## 2023-11-10 ENCOUNTER — OFFICE VISIT (OUTPATIENT)
Dept: INTERNAL MEDICINE | Facility: CLINIC | Age: 23
End: 2023-11-10
Payer: OTHER GOVERNMENT

## 2023-11-10 VITALS
TEMPERATURE: 97 F | DIASTOLIC BLOOD PRESSURE: 60 MMHG | WEIGHT: 159.38 LBS | BODY MASS INDEX: 23.6 KG/M2 | SYSTOLIC BLOOD PRESSURE: 100 MMHG | OXYGEN SATURATION: 99 % | HEART RATE: 74 BPM | HEIGHT: 69 IN

## 2023-11-10 DIAGNOSIS — M25.551 RIGHT HIP PAIN: Primary | ICD-10-CM

## 2023-11-10 PROCEDURE — 99213 OFFICE O/P EST LOW 20 MIN: CPT | Mod: PBBFAC | Performed by: INTERNAL MEDICINE

## 2023-11-10 PROCEDURE — 99214 PR OFFICE/OUTPT VISIT, EST, LEVL IV, 30-39 MIN: ICD-10-PCS | Mod: S$PBB,,, | Performed by: INTERNAL MEDICINE

## 2023-11-10 PROCEDURE — 99214 OFFICE O/P EST MOD 30 MIN: CPT | Mod: S$PBB,,, | Performed by: INTERNAL MEDICINE

## 2023-11-10 PROCEDURE — 99999 PR PBB SHADOW E&M-EST. PATIENT-LVL III: ICD-10-PCS | Mod: PBBFAC,,, | Performed by: INTERNAL MEDICINE

## 2023-11-10 PROCEDURE — 99999 PR PBB SHADOW E&M-EST. PATIENT-LVL III: CPT | Mod: PBBFAC,,, | Performed by: INTERNAL MEDICINE

## 2023-11-10 RX ORDER — CYCLOBENZAPRINE HCL 10 MG
TABLET ORAL
Qty: 30 TABLET | Refills: 0 | Status: SHIPPED | OUTPATIENT
Start: 2023-11-10 | End: 2023-12-05

## 2023-11-10 RX ORDER — NAPROXEN 500 MG/1
500 TABLET ORAL 2 TIMES DAILY WITH MEALS
Qty: 15 TABLET | Refills: 0 | Status: SHIPPED | OUTPATIENT
Start: 2023-11-10 | End: 2023-12-05

## 2023-11-10 NOTE — PROGRESS NOTES
"Subjective:      Patient ID: Annmarie Chino is a 23 y.o. female.    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 .  Pain has been present for approximately 3 days it is waxing and waning.  She can find comfortable positions.  She does not remember any specific injury.  Seems she awakened 1 morning with these symptoms.  Pain is worse with certain movements.  Mild pain with walking.  She is not tried any treatments for this.  There has been no redness or warmth.    Review of Systems   Constitutional:  Negative for chills and fever.   Respiratory:  Negative for cough.    Cardiovascular:  Negative for chest pain.   Gastrointestinal:  Negative for abdominal pain.     Objective:   /60 (BP Location: Left arm, Patient Position: Sitting, BP Method: Large (Manual))   Pulse 74   Temp 97.4 °F (36.3 °C) (Tympanic)   Ht 5' 9.02" (1.753 m)   Wt 72.3 kg (159 lb 6.3 oz)   SpO2 99%   BMI 23.53 kg/m²     Physical Exam    No tenderness to hip or groin.  Pain is reproduced in the right groin area with flexion.  There is a pulling sensation to the right groin with extension at the right hip.  Straight leg tests are negative bilaterally.  Good range of motion at lower spine without reproduction of pain.  Pain is reproduced with internal rotation at right hip.  No pain with external rotation.  There is some pain to the right outer hip with abduction at right hip.    Lab Results   Component Value Date    WBC 4.10 08/10/2023 "    HGB 12.4 08/10/2023    HGB 12.1 02/28/2019    HGB 12.9 12/03/2018    HCT 39.5 08/10/2023    MCV 83 08/10/2023    MCV 84 02/28/2019    MCV 84 12/03/2018     08/10/2023    ALT 10 08/10/2023    AST 14 08/10/2023     08/10/2023    K 4.0 08/10/2023    CALCIUM 9.9 08/10/2023     08/10/2023    CO2 22 (L) 08/10/2023    BUN 8 08/10/2023    CREATININE 0.9 08/10/2023    CREATININE 0.9 02/28/2019    CREATININE 0.9 12/03/2018    EGFRNORACEVR >60.0 08/10/2023    TSH 2.796 08/10/2023    TSH 1.018 05/18/2016    GLU 73 08/10/2023          The ASCVD Risk score (Juan Luis DK, et al., 2019) failed to calculate for the following reasons:    The 2019 ASCVD risk score is only valid for ages 40 to 79     Assessment:     1. Right hip pain      Plan:   1. Right hip pain  -     naproxen (NAPROSYN) 500 MG tablet; Take 1 tablet (500 mg total) by mouth 2 (two) times daily with meals. For pain and inflammation  Dispense: 15 tablet; Refill: 0  -     cyclobenzaprine (FLEXERIL) 10 MG tablet; 1/2 to one tab po qhs prn muscle spasm  Dispense: 30 tablet; Refill: 0        There are no Patient Instructions on file for this visit.    Future Appointments   Date Time Provider Department Center   11/28/2023  1:00 PM Renetta Gordon MD [facility]       Lab Frequency Next Occurrence   Ambulatory referral/consult to ENT Once 12/19/2022   Hepatitis C antibody Once 04/12/2023   POCT Urine Pregnancy Every 12 Weeks        Follow up if symptoms worsen or fail to improve.

## 2023-11-12 ENCOUNTER — OFFICE VISIT (OUTPATIENT)
Dept: URGENT CARE | Facility: CLINIC | Age: 23
End: 2023-11-12
Payer: OTHER GOVERNMENT

## 2023-11-12 ENCOUNTER — ON-DEMAND VIRTUAL (OUTPATIENT)
Dept: URGENT CARE | Facility: CLINIC | Age: 23
End: 2023-11-12
Payer: OTHER GOVERNMENT

## 2023-11-12 ENCOUNTER — PATIENT MESSAGE (OUTPATIENT)
Dept: INTERNAL MEDICINE | Facility: CLINIC | Age: 23
End: 2023-11-12
Payer: OTHER GOVERNMENT

## 2023-11-12 VITALS
SYSTOLIC BLOOD PRESSURE: 107 MMHG | WEIGHT: 159 LBS | BODY MASS INDEX: 23.55 KG/M2 | RESPIRATION RATE: 16 BRPM | HEART RATE: 73 BPM | TEMPERATURE: 98 F | DIASTOLIC BLOOD PRESSURE: 62 MMHG | OXYGEN SATURATION: 99 % | HEIGHT: 69 IN

## 2023-11-12 DIAGNOSIS — R21 RASH: Primary | ICD-10-CM

## 2023-11-12 DIAGNOSIS — M25.551 RIGHT HIP PAIN: Primary | ICD-10-CM

## 2023-11-12 DIAGNOSIS — J01.10 ACUTE NON-RECURRENT FRONTAL SINUSITIS: Primary | ICD-10-CM

## 2023-11-12 PROCEDURE — 99213 PR OFFICE/OUTPT VISIT, EST, LEVL III, 20-29 MIN: ICD-10-PCS | Mod: 25,S$GLB,, | Performed by: NURSE PRACTITIONER

## 2023-11-12 PROCEDURE — 96372 THER/PROPH/DIAG INJ SC/IM: CPT | Mod: S$GLB,,, | Performed by: NURSE PRACTITIONER

## 2023-11-12 PROCEDURE — 99213 PR OFFICE/OUTPT VISIT, EST, LEVL III, 20-29 MIN: ICD-10-PCS | Mod: 95,,, | Performed by: NURSE PRACTITIONER

## 2023-11-12 PROCEDURE — 99213 OFFICE O/P EST LOW 20 MIN: CPT | Mod: 25,S$GLB,, | Performed by: NURSE PRACTITIONER

## 2023-11-12 PROCEDURE — 99213 OFFICE O/P EST LOW 20 MIN: CPT | Mod: 95,,, | Performed by: NURSE PRACTITIONER

## 2023-11-12 PROCEDURE — 96372 PR INJECTION,THERAP/PROPH/DIAG2ST, IM OR SUBCUT: ICD-10-PCS | Mod: S$GLB,,, | Performed by: NURSE PRACTITIONER

## 2023-11-12 RX ORDER — DEXAMETHASONE SODIUM PHOSPHATE 100 MG/10ML
10 INJECTION INTRAMUSCULAR; INTRAVENOUS ONCE
Status: COMPLETED | OUTPATIENT
Start: 2023-11-12 | End: 2023-11-12

## 2023-11-12 RX ORDER — AZELASTINE 1 MG/ML
1 SPRAY, METERED NASAL 2 TIMES DAILY
Qty: 30 ML | Refills: 0 | Status: SHIPPED | OUTPATIENT
Start: 2023-11-12 | End: 2023-12-05

## 2023-11-12 RX ORDER — PREDNISONE 20 MG/1
20 TABLET ORAL DAILY
Qty: 3 TABLET | Refills: 0 | Status: SHIPPED | OUTPATIENT
Start: 2023-11-12 | End: 2023-11-15

## 2023-11-12 RX ORDER — TRIAMCINOLONE ACETONIDE 1 MG/G
OINTMENT TOPICAL 2 TIMES DAILY
Qty: 80 G | Refills: 0 | Status: SHIPPED | OUTPATIENT
Start: 2023-11-12 | End: 2023-12-18

## 2023-11-12 RX ADMIN — DEXAMETHASONE SODIUM PHOSPHATE 10 MG: 100 INJECTION INTRAMUSCULAR; INTRAVENOUS at 11:11

## 2023-11-12 NOTE — LETTER
November 13, 2023      [facility] [address]  [address] [facility]  [place] [address]  Phone: [phone]  Fax: [phone]       Patient: Annmarie Chino   YOB: 2000  Date of Visit: 11/13/2023    To Whom It May Concern:    Mar Chino  was at [facility] on 11/10/2023. The patient may return to work/school on 11/20/2023. If you have any questions or concerns, or if I can be of further assistance, please do not hesitate to contact me.    Sincerely,    Brittny Esparza LPN

## 2023-11-12 NOTE — PROGRESS NOTES
Subjective:      Patient ID: Annmarie Chino is a 23 y.o. female.    Vitals:  vitals were not taken for this visit.     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 No pertinent surgical history.  Review of patient's allergies indicates:  No Known Allergies  Current Outpatient Medications on File Prior to Visit   Medication Sig Dispense Refill    cyclobenzaprine (FLEXERIL) 10 MG tablet 1/2 to one tab po qhs prn muscle spasm 30 tablet 0    naproxen (NAPROSYN) 500 MG tablet Take 1 tablet (500 mg total) by mouth 2 (two) times daily with meals. For pain and inflammation 15 tablet 0     No current facility-administered medications on file prior to visit.     Family History   Problem Relation Age of Onset    Cancer Mother     Hypertension Father     Hyperlipidemia Father     Diabetes Paternal Grandmother     Diabetes Paternal Grandfather     Breast cancer Maternal Grandmother     Colon cancer Neg Hx     Ovarian cancer Neg Hx        Medications Ordered                [id number] - [place]  [address] [place] [address] [place] [address] [place] [address]    Telephone: [phone]   Fax: [phone]   Hours: Not open 24 hours                         E-Prescribed (2 of 2)              azelastine (ASTELIN) 137 mcg (0.1 %) nasal spray    Si spray (137 mcg total) by Nasal route 2 (two) times daily.       Start: 23     Quantity: 30 mL Refills: 0                         predniSONE (DELTASONE) 20 MG tablet    Sig: Take 1 tablet (20 mg total) by mouth once daily. for 3 days        Start: 11/12/23     Quantity: 3 tablet Refills: 0                           Ohs Peq Odvv Intake    11/12/2023  9:33 AM CST - Filed by Patient   Describe your reason for todays visit Blocked sinuses   What is your current physical address in the event of a medical emergency? [address] [facility] [place], [place], [place]   Are you able to take your vital signs? No   Please attach any relevant images or files          24 yo female with c/o sinus symptoms. She states she recently saw her pcp for right groin pain and was prescribed nsaid and muscle relaxant. She states she started with sinus symptoms yesterday. She denies fever but does not have thermometer. Positive runny nose, denies sore throat, positive nasal congestion. She states cough from drip only.     Sinus Problem  Associated symptoms include congestion, ear pain, headaches, shortness of breath and sneezing. Pertinent negatives include no coughing, sinus pressure or sore throat. Past treatments include acetaminophen. The treatment provided no relief.       Constitution: Negative.   HENT:  Positive for ear pain and congestion. Negative for sinus pressure and sore throat.    Cardiovascular: Negative.    Respiratory:  Positive for shortness of breath. Negative for cough.    Gastrointestinal: Negative.    Endocrine: negative.   Genitourinary: Negative.  Negative for frequency and urgency.   Musculoskeletal: Negative.    Skin: Negative.    Allergic/Immunologic: Negative.  Positive for sneezing.   Neurological:  Positive for headaches.   Hematologic/Lymphatic: Negative.    Psychiatric/Behavioral: Negative.          Objective:   The physical exam was conducted virtually.  Physical Exam   Constitutional: She is oriented to person, place, and time. She appears well-developed.   HENT:   Head: Normocephalic and atraumatic.   Ears:   Right Ear: Hearing, tympanic membrane and external ear normal.   Left Ear: Hearing, tympanic membrane and external ear normal.   Nose: Nose normal.    Mouth/Throat: Uvula is midline, oropharynx is clear and moist and mucous membranes are normal.   Eyes: Conjunctivae and EOM are normal. Pupils are equal, round, and reactive to light.   Neck: Neck supple.   Cardiovascular: Normal rate.   Pulmonary/Chest: Effort normal and breath sounds normal.   Musculoskeletal: Normal range of motion.         General: Normal range of motion.   Neurological: She is alert and oriented to person, place, and time.   Skin: Skin is warm.   Psychiatric: Her behavior is normal. Thought content normal.   Nursing note and vitals reviewed.      Assessment:     1. Acute non-recurrent frontal sinusitis        Plan:     Medical records from outside sources reviewed- dr hodge. Advised to stop naproxen and take prednisone daily for three days. Follow up with pcp if not improving in both hip and sinuses.       Patient Instructions   -Below are suggestions for symptomatic relief:              -Tylenol every 4 hours OR ibuprofen every 6 hours as needed for pain/fever.              -Salt water gargles to soothe throat pain.              -Chloroseptic spray also helps to numb throat pain.              -Nasal saline spray reduces inflammation and dryness.              -Warm face compresses to help with facial sinus pain/pressure.              -Vicks vapor rub at night.              -Flonase OTC or Nasacort OTC for nasal congestion.              -Simple foods like chicken noodle soup.              -Delsym helps with coughing at night              -Zyrtec/Claritin during the day & Benadryl at night may help with allergies.     If you DO NOT have Hypertension or any history of palpitations, it is ok to take over the counter Sudafed or Mucinex D or Allegra-D or Claritin-D or Zyrtec-D.  If you do take one of the above, it is ok to combine that with plain over the counter Mucinex or Allegra or Claritin or Zyrtec. If, for example, you are taking Zyrtec -D, you can combine that with Mucinex, but not  Mucinex-D.  If you are taking Mucinex-D, you can combine that with plain Allegra or Claritin or Zyrtec.   If you DO have Hypertension or palpitations, it is safe to take Coricidin HBP for relief of sinus symptoms.     Please follow up with your Primary care provider within 2-5 days if your signs and symptoms have not resolved or worsen.      If your condition worsens or fails to improve we recommend that you receive another evaluation at the emergency room immediately or contact your primary medical clinic to discuss your concerns.   You must understand that you have received an Urgent Care treatment only and that you may be released before all of your medical problems are known or treated. You, the patient, will arrange for follow up care as instructed.      RED FLAGS/WARNING SYMPTOMS DISCUSSED WITH PATIENT THAT WOULD WARRANT EMERGENT MEDICAL ATTENTION. PATIENT VERBALIZED UNDERSTANDING.       Acute non-recurrent frontal sinusitis  -     predniSONE (DELTASONE) 20 MG tablet; Take 1 tablet (20 mg total) by mouth once daily. for 3 days  Dispense: 3 tablet; Refill: 0  -     azelastine (ASTELIN) 137 mcg (0.1 %) nasal spray; 1 spray (137 mcg total) by Nasal route 2 (two) times daily.  Dispense: 30 mL; Refill: 0

## 2023-11-12 NOTE — LETTER
November 12, 2023      [facility] [address] [place] [address]  Phone: [phone]  Fax: [phone]       Patient: Annmarie Chino   YOB: 2000  Date of Visit: 11/12/2023    To Whom It May Concern:    Mar Chino  was at [facility] on 11/12/2023. The patient may return to work/school on 11/15/23 with no restrictions. If you have any questions or concerns, or if I can be of further assistance, please do not hesitate to contact me.    Sincerely,        Sudhir Meza NP

## 2023-11-12 NOTE — PROGRESS NOTES
"Subjective:      Patient ID: Annmarie Chino is a 23 y.o. female.    Vitals:  height is 5' 9" (1.753 m) and weight is 72.1 kg (159 lb). Her temperature is 98.3 °F (36.8 °C). Her blood pressure is 107/62 and her pulse is 73. Her respiration is 16 and oxygen saturation is 99%.     Chief Complaint: Rash (Right leg)    Annmarie Chino is a 23 year old female whom presents to urgent care for evaluation of a rash on her right leg that appeared 4 days ago. Patient states the site is itchy and the rash is spreading. Patient denies any new medications, foods or products. Patient denies any individuals in the household has a similar rash. Patient also reports her right hip/ groin pain is not improving. She states she is currently being treated by her PCP for this issue.  Patient also reports cough and congestion.    Rash  This is a new problem. Episode onset: 4 days ago. The problem has been gradually worsening since onset. The affected locations include the right hip, right upper leg and right lower leg. The rash is characterized by itchiness, redness and swelling. She was exposed to nothing. Associated symptoms include congestion, coughing and rhinorrhea. Pertinent negatives include no anorexia, diarrhea, eye pain, facial edema, fatigue, fever, joint pain, nail changes, shortness of breath, sore throat or vomiting. Past treatments include nothing. Her past medical history is significant for allergies. There is no history of asthma, eczema or varicella.       Constitution: Negative for fatigue and fever.   HENT:  Positive for congestion. Negative for sore throat.    Eyes:  Negative for eye pain.   Respiratory:  Positive for cough. Negative for shortness of breath.    Gastrointestinal:  Negative for vomiting and diarrhea.   Skin:  Positive for rash.      Objective:     Physical Exam   HENT:   Head: Normocephalic and atraumatic.   Ears:   Right Ear: Tympanic membrane normal.   Left Ear: Tympanic membrane " normal.   Nose: Congestion present. No rhinorrhea.   Mouth/Throat: Mucous membranes are moist. Oropharynx is clear.   Eyes: Pupils are equal, round, and reactive to light. Extraocular movement intact   Neck: Neck supple.   Cardiovascular: Normal rate and regular rhythm.   Pulmonary/Chest: Effort normal and breath sounds normal.   Abdominal: Normal appearance and bowel sounds are normal. Soft. flat abdomen   Musculoskeletal:         General: Tenderness present.   Neurological: no focal deficit. She is alert and at baseline.   Skin: Skin is warm, dry, rash and maculopapular. Capillary refill takes less than 2 seconds.        Psychiatric: Her behavior is normal. Mood, judgment and thought content normal.   Nursing note and vitals reviewed.          Assessment:     1. Rash        Plan:       Rash  -     triamcinolone acetonide 0.1% (KENALOG) 0.1 % ointment; Apply topically 2 (two) times daily. for 14 days  Dispense: 80 g; Refill: 0  -     Ambulatory referral/consult to Dermatology    Other orders  -     dexAMETHasone injection 10 mg          Medical Decision Making:   Differential Diagnosis:   Juvenile idiopathic arthritis and adult-onset Still disease, exanthematous drug eruption includes viral and bacterial exanthems, secondary syphilis,cute generalized exanthematous pustulosis (AGEP), drug reaction with eosinophilia and systemic symptoms (DRESS), and Le-Hector syndrome/toxic epidermal necrolysis  Urgent Care Management:  Previous encounters  were independently reviewed. Discussed with patient  all pertinent information and results. Suspected reaction of unknown origin. Will cover with decadron injection and topical steroids. Discussed patient diagnosis and plan of treatment. Additional plan of care as outlined above. Patient  was given all follow up and return instructions. All questions and concerns were addressed at this time. Patient  expresses understanding of information and instructions, and is  comfortable with plan.    Patient was instructed to follow up with dermatology if no improvement in symptoms in 2-3 DAYS:  or go to ED immediately for any worsening or change in current symptoms. Treatment plan as well as options and alternatives reviewed and discussed with patient. All of the patients questions and concerns were addressed.The patient verbalized understanding and agrees with the discussed plan of care. Patient remained stable and was discharged in no acute distress.                   Patient Instructions   A referral has been placed for you to follow up with Derm if no improvement in is noted with your rash. Someone should be contacting you soon to set up that appointment. However, you may call [phone] at anytime to schedule this follow up appointment.    Please drink plenty of fluids. Please get plenty of rest.    Please follow up with your PCP or go to the Emergency Department for any concerns or worsening of condition.    Please take over the counter Pepcid or Zantac as directed for the next 24-72hours as needed.    If you were given a steroid shot in the clinic and have also been given a prescription for a steroid such as Prednisone or a Medrol Dose Pack, please begin taking them tomorrow. If you received a steroid shot today - this can elevate your blood pressure, elevate your blood sugar, water weight gain, nervous energy, redness to the face and dimpling of the skin where the shot goes in.       Please take Claritin or Zyrtec or Allegra (24 hours) twice a day.  You can add Benadryl or Hydroxyzine as needed for itching, however these may make you drowsy, so do not  drive or operate heavy equipment or machinery while taking these medications.       Please arrange follow up with your primary medical clinic as soon as possible. You must understand that you've received an Urgent Care treatment only and that you may be released before all of your medical problems are known or treated. You, the  patient, will arrange for follow up as instructed. If your symptoms worsen or fail to improve you should go to the Emergency Room.

## 2023-11-12 NOTE — LETTER
November 12, 2023      [facility] [address] [place] [address]  Phone: [phone]  Fax: [phone]       Patient: Annmarie Chino   YOB: 2000  Date of Visit: 11/12/2023    To Whom It May Concern:    Mar Chino  was at [facility] on 11/12/2023. Please excuse Annmarie Chino from all strenuous/ physical activity 11/13/23-11/17/23. If you have any questions or concerns, or if I can be of further assistance, please do not hesitate to contact me.    Sincerely,        Sudhir Meza NP

## 2023-11-12 NOTE — PATIENT INSTRUCTIONS
A referral has been placed for you to follow up with Derm if no improvement in is noted with your rash. Someone should be contacting you soon to set up that appointment. However, you may call [phone] at anytime to schedule this follow up appointment.    Please drink plenty of fluids. Please get plenty of rest.    Please follow up with your PCP or go to the Emergency Department for any concerns or worsening of condition.    Please take over the counter Pepcid or Zantac as directed for the next 24-72hours as needed.    If you were given a steroid shot in the clinic and have also been given a prescription for a steroid such as Prednisone or a Medrol Dose Pack, please begin taking them tomorrow. If you received a steroid shot today - this can elevate your blood pressure, elevate your blood sugar, water weight gain, nervous energy, redness to the face and dimpling of the skin where the shot goes in.       Please take Claritin or Zyrtec or Allegra (24 hours) twice a day.  You can add Benadryl or Hydroxyzine as needed for itching, however these may make you drowsy, so do not  drive or operate heavy equipment or machinery while taking these medications.       Please arrange follow up with your primary medical clinic as soon as possible. You must understand that you've received an Urgent Care treatment only and that you may be released before all of your medical problems are known or treated. You, the patient, will arrange for follow up as instructed. If your symptoms worsen or fail to improve you should go to the Emergency Room.

## 2023-11-15 ENCOUNTER — TELEPHONE (OUTPATIENT)
Dept: URGENT CARE | Facility: CLINIC | Age: 23
End: 2023-11-15
Payer: OTHER GOVERNMENT

## 2023-12-05 ENCOUNTER — OFFICE VISIT (OUTPATIENT)
Dept: FAMILY MEDICINE | Facility: CLINIC | Age: 23
End: 2023-12-05
Payer: OTHER GOVERNMENT

## 2023-12-05 VITALS
OXYGEN SATURATION: 97 % | HEART RATE: 73 BPM | DIASTOLIC BLOOD PRESSURE: 64 MMHG | HEIGHT: 69 IN | TEMPERATURE: 99 F | SYSTOLIC BLOOD PRESSURE: 98 MMHG | WEIGHT: 160.25 LBS | BODY MASS INDEX: 23.74 KG/M2

## 2023-12-05 DIAGNOSIS — F41.0 PANIC ANXIETY SYNDROME: Primary | ICD-10-CM

## 2023-12-05 PROCEDURE — 99215 OFFICE O/P EST HI 40 MIN: CPT | Mod: S$PBB,,, | Performed by: REGISTERED NURSE

## 2023-12-05 PROCEDURE — 99213 OFFICE O/P EST LOW 20 MIN: CPT | Mod: PBBFAC,PO | Performed by: REGISTERED NURSE

## 2023-12-05 PROCEDURE — 99999 PR PBB SHADOW E&M-EST. PATIENT-LVL III: CPT | Mod: PBBFAC,,, | Performed by: REGISTERED NURSE

## 2023-12-05 PROCEDURE — 99999 PR PBB SHADOW E&M-EST. PATIENT-LVL III: ICD-10-PCS | Mod: PBBFAC,,, | Performed by: REGISTERED NURSE

## 2023-12-05 PROCEDURE — 99215 PR OFFICE/OUTPT VISIT, EST, LEVL V, 40-54 MIN: ICD-10-PCS | Mod: S$PBB,,, | Performed by: REGISTERED NURSE

## 2023-12-05 RX ORDER — HYDROXYZINE HYDROCHLORIDE 10 MG/1
10 TABLET, FILM COATED ORAL 3 TIMES DAILY PRN
Qty: 15 TABLET | Refills: 0 | Status: SHIPPED | OUTPATIENT
Start: 2023-12-05 | End: 2024-03-07

## 2023-12-05 NOTE — PROGRESS NOTES
SUBJECTIVE:     Annmarie Chino is a 23 y.o. female is here today for:  Emesis    HPI:    Montserrat is here with c/o increased anxiety leading to vomiting.  Earlier today, she reports panicked while taking a test for school.  Ran out of the classroom unable to finish the test.  She has since been in touch w/ her professor to make up test.  Panic attacks are frequent, grad worse over time. Finishing up her last semester at [employer], majoring in Kinesiology.  Graduating 1 year early, [employer].  Does have a job interview planned for tomorrow, [place] school in [place].  Reports frequent crying spells, NV, sweats, migraines and hyperventilation.  Anxiety has adversely affected appetite & sleep.  Did quit all alcohol intake about 1 month ago including marijuana and vaping w/ nicotine.       REVIEW OF SYSTEMS:  Review of Systems   Constitutional:  Positive for diaphoresis and malaise/fatigue. Negative for chills, fever and weight loss.   Respiratory:  Positive for shortness of breath.    Cardiovascular:  Positive for palpitations. Negative for chest pain and leg swelling.   Gastrointestinal:  Positive for nausea and vomiting. Negative for abdominal pain and diarrhea.   Neurological:  Positive for headaches. Negative for dizziness, tingling, tremors, seizures, loss of consciousness and weakness.   Psychiatric/Behavioral:  Positive for substance abuse (reports stopped 1 mo ago ---- MJ, nicotine vaping, alcohol). Negative for depression, hallucinations, memory loss and suicidal ideas. The patient is nervous/anxious and has insomnia.          CURRENT ALLERGIES:  Review of patient's allergies indicates:  No Known Allergies    CURRENT PROBLEM LIST:  Patient Active Problem List   Diagnosis    Plantar fasciitis, bilateral    Tarsal coalition    Developmental breast asymmetry       CURRENT MEDICATION LIST:  Current Outpatient Medications   Medication Instructions    triamcinolone acetonide 0.1% (KENALOG) 0.1 % ointment  "Topical (Top), 2 times daily         HISTORY:  Past medical, surgical, family and social histories have been reviewed today.      OBJECTIVE:     Vitals:    12/05/23 1308   BP: 98/64   Pulse: 73   Temp: 98.9 °F (37.2 °C)   SpO2: 97%   Weight: 72.7 kg (160 lb 4.4 oz)   Height: 5' 9" (1.753 m)   PainSc: 0-No pain       Physical Exam  Vitals reviewed.   Constitutional:       Appearance: She is not ill-appearing or diaphoretic.      Comments: Very tearful, crying   Neurological:      Mental Status: She is alert.   Psychiatric:         Attention and Perception: She is attentive. She does not perceive auditory or visual hallucinations.         Mood and Affect: Mood is anxious. Affect is flat and tearful.         Speech: Speech normal.         Behavior: Behavior is agitated. Behavior is not slowed or withdrawn. Behavior is cooperative.         Thought Content: Thought content normal.         Cognition and Memory: Cognition and memory normal.         ASSESSMENT:     1. Panic anxiety syndrome ---- chronic issue, grad worse over time.  CBT strongly advised/encouraged.  For now, hydroxyzine prn.  Cautioned on sedating effects; will not likely be able to take pre-test.  Any daily medication (SSRI or other) will take a few weeks to kick-in.  A good start would be the student health center at [facility] and if not able to be seen there, can get some guidance or direction on where to go or what the next step would be.  Referral can be placed but likely not able to get appt in timely manner.  -     hydrOXYzine HCL (ATARAX) 10 MG Tab; Take 1 tablet (10 mg total) by mouth 3 (three) times daily as needed (acute anxiety/panic ----- CAUTION ON SEDATION).  Dispense: 15 tablet; Refill: 0      PLAN:     Recheck in 1 to 2 weeks, or sooner if needed.  RTC as directed and/or prn.        ADRY Muhammad  [facility]       40 minutes of total time spent on the encounter, which includes face to face time and non-face to " face time preparing to see the patient.  This includes obtaining and/or reviewing separately obtained history, performing a medically appropriate examination and/or evaluation, and counseling and educating the patient/family/caregiver.  Includes documenting clinical information in the electronic or other health record, independently interpreting results (not separately reported) and communicating results to the patient/family/caregiver, with care coordination (not separately reported).  Medications, tests and/or procedures ordered as necessary along with referring and communicating with other health professionals (when not separately reported).

## 2023-12-16 ENCOUNTER — OCCUPATIONAL HEALTH (OUTPATIENT)
Dept: URGENT CARE | Facility: CLINIC | Age: 23
End: 2023-12-16

## 2023-12-16 DIAGNOSIS — Z13.9 ENCOUNTER FOR SCREENING: Primary | ICD-10-CM

## 2023-12-16 DIAGNOSIS — A18.4 TB SKIN/SUBCUTANEOUS: ICD-10-CM

## 2023-12-16 DIAGNOSIS — Z11.1 SCREENING-PULMONARY TB: ICD-10-CM

## 2023-12-16 PROCEDURE — 86580 TB INTRADERMAL TEST: CPT | Mod: S$GLB,,, | Performed by: NURSE PRACTITIONER

## 2023-12-18 ENCOUNTER — HOSPITAL ENCOUNTER (OUTPATIENT)
Dept: RADIOLOGY | Facility: CLINIC | Age: 23
Discharge: HOME | End: 2023-12-18
Attending: STUDENT IN AN ORGANIZED HEALTH CARE EDUCATION/TRAINING PROGRAM
Payer: OTHER GOVERNMENT

## 2023-12-18 ENCOUNTER — OFFICE VISIT (OUTPATIENT)
Dept: FAMILY MEDICINE | Facility: CLINIC | Age: 23
End: 2023-12-18
Payer: OTHER GOVERNMENT

## 2023-12-18 VITALS
HEART RATE: 70 BPM | SYSTOLIC BLOOD PRESSURE: 92 MMHG | BODY MASS INDEX: 24.5 KG/M2 | HEIGHT: 69 IN | WEIGHT: 165.38 LBS | DIASTOLIC BLOOD PRESSURE: 62 MMHG | RESPIRATION RATE: 18 BRPM | TEMPERATURE: 98 F | OXYGEN SATURATION: 100 %

## 2023-12-18 DIAGNOSIS — Z00.00 ENCOUNTER FOR PREVENTIVE HEALTH EXAMINATION: Primary | ICD-10-CM

## 2023-12-18 DIAGNOSIS — Z00.00 ENCOUNTER FOR PREVENTIVE HEALTH EXAMINATION: ICD-10-CM

## 2023-12-18 DIAGNOSIS — G43.809 OTHER MIGRAINE WITHOUT STATUS MIGRAINOSUS, NOT INTRACTABLE: ICD-10-CM

## 2023-12-18 DIAGNOSIS — Z23 NEED FOR VACCINATION: ICD-10-CM

## 2023-12-18 DIAGNOSIS — R63.5 WEIGHT GAIN: ICD-10-CM

## 2023-12-18 PROCEDURE — 99214 OFFICE O/P EST MOD 30 MIN: CPT | Mod: PBBFAC,25,PO | Performed by: STUDENT IN AN ORGANIZED HEALTH CARE EDUCATION/TRAINING PROGRAM

## 2023-12-18 PROCEDURE — 71046 XR CHEST PA AND LATERAL: ICD-10-PCS | Mod: 26,,, | Performed by: RADIOLOGY

## 2023-12-18 PROCEDURE — 99999 PR PBB SHADOW E&M-EST. PATIENT-LVL IV: ICD-10-PCS | Mod: PBBFAC,,, | Performed by: STUDENT IN AN ORGANIZED HEALTH CARE EDUCATION/TRAINING PROGRAM

## 2023-12-18 PROCEDURE — 99999PBSHW FLU VACCINE (QUAD) GREATER THAN OR EQUAL TO 3YO PRESERVATIVE FREE IM: ICD-10-PCS | Mod: PBBFAC,,,

## 2023-12-18 PROCEDURE — 90686 IIV4 VACC NO PRSV 0.5 ML IM: CPT | Mod: PBBFAC,PO

## 2023-12-18 PROCEDURE — 99999PBSHW FLU VACCINE (QUAD) GREATER THAN OR EQUAL TO 3YO PRESERVATIVE FREE IM: Mod: PBBFAC,,,

## 2023-12-18 PROCEDURE — 99395 PREV VISIT EST AGE 18-39: CPT | Mod: 25,S$PBB,, | Performed by: STUDENT IN AN ORGANIZED HEALTH CARE EDUCATION/TRAINING PROGRAM

## 2023-12-18 PROCEDURE — 99395 PR PREVENTIVE VISIT,EST,18-39: ICD-10-PCS | Mod: 25,S$PBB,, | Performed by: STUDENT IN AN ORGANIZED HEALTH CARE EDUCATION/TRAINING PROGRAM

## 2023-12-18 PROCEDURE — 71046 X-RAY EXAM CHEST 2 VIEWS: CPT | Mod: 26,,, | Performed by: RADIOLOGY

## 2023-12-18 PROCEDURE — 99999 PR PBB SHADOW E&M-EST. PATIENT-LVL IV: CPT | Mod: PBBFAC,,, | Performed by: STUDENT IN AN ORGANIZED HEALTH CARE EDUCATION/TRAINING PROGRAM

## 2023-12-18 PROCEDURE — 71046 X-RAY EXAM CHEST 2 VIEWS: CPT | Mod: TC,FY,PO

## 2023-12-18 RX ORDER — DROSPIRENONE AND ETHINYL ESTRADIOL 0.03MG-3MG
1 KIT ORAL DAILY
COMMUNITY
End: 2023-12-27

## 2023-12-18 RX ORDER — TOPIRAMATE 25 MG/1
50 TABLET ORAL NIGHTLY
Qty: 60 TABLET | Refills: 2 | Status: SHIPPED | OUTPATIENT
Start: 2023-12-18 | End: 2024-03-07

## 2023-12-18 NOTE — PROGRESS NOTES
[facility] Note    Subjective:    The HPI and pertinent ROS is included in the Diagnostic Impression Remarks section at the end of the note. Please see below for further details. Chief complaint is at end of note.     Montserrat is a pleasant intelligent patient who is here for evaluation.     Modified Medications    No medications on file       Data reviewed    274}  Previous medical records reviewed and summarized in plan section at end of note.      If you are due for any health screening(s) below please notify me so we can arrange them to be ordered and scheduled. Most healthy patients at your age complete them, but you are free to accept or refuse. If you can't do it, I'll definitely understand. If you can, I'd certainly appreciate it!     All of your core healthy metrics are met.      The following portions of the patient's history were reviewed and updated as appropriate: allergies, current medications, past family history, past medical history, past social history, past surgical history and problem list.    She  has a past medical history of Abnormal eye movements (07/11/2016), COVID-19, Headache, Lazy eye (07/11/2016), Orbital cellulitis on left (07/11/2016), Viral encephalitis, and Viral encephalitis.  She  has no past surgical history on file.    She  reports that she has quit smoking. Her smoking use included vaping with nicotine. She has never been exposed to tobacco smoke. She has never used smokeless tobacco. She reports current alcohol use. She reports that she does not use drugs.  She family history includes Breast cancer in her maternal grandmother; Cancer in her mother; Diabetes in her paternal grandfather and paternal grandmother; Hyperlipidemia in her father; Hypertension in her father.    Review of patient's allergies indicates:  No Known Allergies    Tobacco Use: Medium Risk (12/18/2023)    Patient History     Smoking Tobacco Use: Former     Smokeless Tobacco Use: Never      "Passive Exposure: Never     Physical Examination  General appearance: alert, cooperative, no distress  Neck: no thyromegaly, no neck stiffness  Lungs: clear to auscultation, no wheezes, rales or rhonchi, symmetric air entry  Heart: normal rate, regular rhythm, normal S1, S2, no murmurs, rubs, clicks or gallops  Abdomen: soft, nontender, nondistended, no rigidity, rebound, or guarding.   Back: no point tenderness over spine  Extremities: peripheral pulses normal, no unilateral leg swelling or calf tenderness   Neurological:alert, oriented, normal speech, no new focal findings or movement disorder noted from baseline    BP Readings from Last 3 Encounters:   12/18/23 92/62   12/05/23 98/64   11/12/23 107/62     Wt Readings from Last 3 Encounters:   12/18/23 75 kg (165 lb 5.5 oz)   12/05/23 72.7 kg (160 lb 4.4 oz)   11/12/23 72.1 kg (159 lb)     BP 92/62 (BP Location: Right arm, Patient Position: Sitting, BP Method: Large (Manual))   Pulse 70   Temp 98.4 °F (36.9 °C) (Oral)   Resp 18   Ht 5' 9" (1.753 m)   Wt 75 kg (165 lb 5.5 oz)   LMP 11/22/2023 (Approximate)   SpO2 100%   BMI 24.42 kg/m²       274}  Laboratory: I have reviewed old labs below:       274}    Lab Results   Component Value Date    WBC 4.10 08/10/2023    HGB 12.4 08/10/2023    HCT 39.5 08/10/2023    MCV 83 08/10/2023     08/10/2023     08/10/2023    K 4.0 08/10/2023     08/10/2023    CALCIUM 9.9 08/10/2023    CO2 22 (L) 08/10/2023    GLU 73 08/10/2023    BUN 8 08/10/2023    CREATININE 0.9 08/10/2023    EGFRNORACEVR >60.0 08/10/2023    ANIONGAP 12 08/10/2023    PROT 7.2 08/10/2023    ALBUMIN 3.8 08/10/2023    BILITOT 0.4 08/10/2023    ALKPHOS 45 (L) 08/10/2023    ALT 10 08/10/2023    AST 14 08/10/2023    TSH 2.796 08/10/2023      Lab reviewed by me: Particular labs of significance that I will monitor, workup, or treat to improve are mentioned below in diagnostic impression remarks.    Imaging/EKG: I have reviewed the pertinent " "results and my findings are noted in remarks.     274}    CC:   Chief Complaint   Patient presents with    Annual Exam           274}    Assessment/Plan  Annmarie Pennington is a 23 y.o. female who presents to clinic with:  1. Encounter for preventive health examination    2. Weight gain    3. Other migraine without status migrainosus, not intractable    4. Need for vaccination          274}  Diagnostic Impression Remarks + HPI     Documentation entered by me for this encounter may have been done in part using speech-recognition technology. Although I have made an effort to ensure accuracy, "sound like" errors may exist and should be interpreted in context.     Preventive-will get blood work and follow-up on this doing well.   Weight gain-patient reports that she has had some weight gain over the past few months no changes with diet wants to try medication for weight loss discussed multiple different lifestyle therapy and patient is already doing this she wanted to try a medication to help lose weight discussed multiple medications and did discuss Topamax because she does have around 2 migraines per month these are severe.  Discuss that she can not become pregnant while taking this medication and that she must do a pregnancy test every month and also take or will contraceptives.  Also discuss that the efficacy of OCPs can be decreased and that using additional barrier method is recommended as well.  Went over the side effects as well such as brain fog and after discussing risk and benefits she wants to try this to prevent her migraine can also help with weight loss as well I think it is a reasonable option to use and will also follow up on blood work as well  Migraines-needs improvement she is tried Triptan in the past along with NSAIDs she wants to try a longer-acting preventive medication discussed risk benefits of Topamax and will send in       This is the extent of this pleasant patient's concerns at this " present time. She did not feel chest pain upon exertion, dyspnea, nausea, vomiting, diaphoresis, or syncope. No pleuritic chest pain, unilateral leg swelling, calf tenderness, or calf pain. Negative for unintentional weight loss night sweats, hematuria, and fevers. Annmarie will return to clinic in a few months for further workup and reassessment or sooner as needed. She was instructed to call the clinic or go to the emergency department or urgent care immediately if her symptoms do not improve, worsens, or if any new symptoms develop. As we discussed that symptoms could worsen over the next 24 hours she was advised that if any increased swelling, pain, or numbness arise to go immediately to the ED. Patient knows to call any time if an emergency arises. Shared decision making occurred and she verbalized understanding in agreement with this plan. I discussed imaging findings, diagnosis, possibilities, treatment options, medications, risks, and benefits. She had many questions regarding the options and long-term effects. All questions were answered. She expressed understanding after counseling regarding the diagnosis and recommendations. She was capable and demonstrated competence with understanding of these options. Shared decision making was performed resulting in her choosing the current treatment plan. Patient handout was given with instructions and recommendations. Advised the patient that if they become pregnant to alert us immediately to assess for medication changes. Having a healthy weight can decrease the risk of 13 cancers and is an important goal. I also discussed the importance of close follow up to discuss labs, change or modify her medications if needed, monitor side effects, and further evaluation of medical problems.     Patient was informed that topiramate is used for migraine prevention and weight loss. Weight loss is a common side effect that is well documented. S/he understands this. S/he was  informed of the potential side effects such as serious and possibly fatal rash in which case the medication should be discontinued immediately. Paresthesias, forgetfulness, fatigue, kidney stones, GI symptoms, and changes in lab values such as electrolytes, blood counts and kidney function.   Additional workup planned: see labs ordered below.    See below for labs and meds ordered with associated diagnosis      1. Encounter for preventive health examination  - Comprehensive Metabolic Panel; Future  - Hemoglobin A1C; Future  - Lipid Panel; Future  - Iron and TIBC; Future  - Ferritin; Future  - X-Ray Chest PA And Lateral; Future  - Pregnancy, urine rapid; Future    2. Weight gain  - TSH; Future  - T4, FREE; Future  - topiramate (TOPAMAX) 25 MG tablet; Take 2 tablets (50 mg total) by mouth every evening.  Dispense: 60 tablet; Refill: 2    3. Other migraine without status migrainosus, not intractable  - topiramate (TOPAMAX) 25 MG tablet; Take 2 tablets (50 mg total) by mouth every evening.  Dispense: 60 tablet; Refill: 2    4. Need for vaccination  - Influenza - Quadrivalent (PF)      Freedom Rodriguez MD      274}    If you are due for any health screening(s) below please notify me so we can arrange them to be ordered and scheduled. Most healthy patients at your age complete them, but you are free to accept or refuse.     If you can't do it, I'll definitely understand. If you can, I'd certainly appreciate it!   All of your core healthy metrics are met.

## 2023-12-18 NOTE — PATIENT INSTRUCTIONS
Eyal Anguiano, Please read below to learn more on healthy habits.  Most of my patients read it.     If you are due for any health screening(s) below please notify me so we can arrange them to be ordered and scheduled. Most healthy patients at your age complete them, but you are free to accept or refuse.     If you can't do it, I'll definitely understand. If you can, I'd certainly appreciate it!     All of your core healthy metrics are met.            Table of Contents:     Cancer prevention  Explanation on the different components of your blood work and interpretation  Frequently asked questions   Blood pressure log   E-Visits  E-Consults     Cancer Prevention    Why should you choose to get screened for cancer? One simple reason is because you are important. You matter and deserve to have the best health so you can fulfill your great potential.     Colon Cancer screening - Most colon cancers can be prevented by screening. In most cases a polyp in the colon can grow and is not cancerous at first, but it can become cancerous years later. A polyp is like a seed that can grow into a weed if it is left in the soil. If the seed is detected and removed, no weed sprouts. A FIT test/Cologuard test and colonoscopy can detect precancerous polyps and lead to the prevention of cancer. A polyp is just like a seed that can be removed before the roots take hold. A colonoscopy can remove these polyps and eliminate the chance that these polyps turn into cancer.     Cervical Cancer screening- A PAP smear can detect precancerous cells that can become cervical cancer and can lead to procedures to remove them. It is very important to get a PAP smear as investing these few minutes can help prevent a lot of trouble down the road. If you want to do the most you can do to spend more time with your family and friends', cancer screenings can help with that goal.     Breast Cancer screening- Mammograms can detect breast cancer before it  "has spread and early detection allows the ability to remove the lesion prior to any spread. It is similar to a small rotten spot on fruit. If the spoiled part is removed quickly it can preserve the rest of the fruit, but if it is left alone it will corrupt the whole peach.     Smoking Cessation- "Smoking is like a chimney. The tar builds up and causes a back draft which leads to a cough. Smoking is like sitting in a car with a blocked exhaust system." Smoking can increase your risk for lung, mouth, throat, nose, esophagus, bladder, kidney, ureter, pancreas, stomach, liver, cervix, ovary, bowel, and leukemia [2]. If you have smoked in the past, you might meet the criteria for a CT lung cancer screening.     Lung Cancer Screening - "The U.S. Preventive Services Task Force (USPSTF) recommendsexternal icon yearly lung cancer screening with LDCT for people who--have a 20 pack-year or more smoking history, and smoke now or have quit within the past 15 years, and are between 50 and 80 years old. A pack-year is smoking an average of one pack of cigarettes per day for one year. For example, a person could have a 20 pack-year history by smoking one pack a day for 20 years or two packs a day for 10 years." [3]    Hypertension - Common high blood pressure meds may lower colorectal cancer risk-MARIA, July 6, 2020 - Hypertension Journal Report Medications commonly prescribed to treat high blood pressure may also reduce patients' colorectal cancer risk, according to new research published today in Hypertension, an American Heart Association journal." [4].     Low HbA1c - "Higher HbA1c levels (within both the non?diabetic and diabetic ranges) were associated with the risk of colorectal cancer (Model 2; P linear?=?0.009), especially for colon cancer." [5].    A healthy diet, exercise, limitation of alcohol use, certain infections, avoidance of radiation/environmental toxins, and staying lean lowers your cancer risk [6].     I want " to empower you to make informed choices for your health. I have a listed below the most common blood components that we check for when you get blood work. I discuss what each component measures along with common reasons for why they can be abnormal. If you are interested in understanding your blood work better, please read the lab explanation attached below.     Explanation of Lab Results    Please note: This information is included as a reference to help you better understand your lab results and is not be used for diagnosis.     Lipid Panel:  Cholesterol is a measure of cardiac risk and stroke risk. A lipid panel measures total cholesterol and provides readings which are broken down into 3 subsections: Triglycerides, HDL and LDL.    Total Cholesterol: Your total blood cholesterol is a measure of LDL cholesterol, HDL cholesterol, and other lipid components.  If your individual lipid level components are in the normal range and you have an elevated total cholesterol level we are generally not to concerned since we primarily look at the LDL cholesterol which is considered the bad cholesterol which can lead to heart attacks and strokes.  Your calculated cardiac risk is the most important factor in deciding if you would benefit from a cholesterol-lowering medication that the total cholesterol level.    Triglycerides are most diet and weight sensitive. They are affected easily by lifestyle changes. Ideally, this reading should be less than 150, although if the remainder of the cholesterol panel is within normal limits, we will tolerate upwards of 250-300. For the most part, if triglycerides are the only part of your cholesterol panel reading as 'abnormal', it is best to lose weight and modify your diet, including less saturated fat and less simple sugars. We only start medication to lower this is the level is greater than 500 since this can increase ones risk for pancreatitis. This is not the cholesterol type that leads  to heart attacks.     HDL is your 'good cholesterol.' Ideally, the reading should be over 40 and is particularly helpful when it is greater than 60. Research indicates that high HDL is extremely protective; and if your HDL level is greater than 60, we tolerate far worse LDL or triglyceride levels. For the most part, HDL is responsive to aerobic activity and ideal weight. We do not have medicines that increase the HDL reading very easily.    LDL is your 'bad cholesterol.' Our goals depend on how many cardiac risk factors you have.     High LDL: If you are diabetic or have had a heart attack, we like the LDL level to be less than 100. If you have 2 cardiac risk factors (such as diabetes, hypertension, family history, a low HDL and smoking), we like your LDL to be less than 130. If you have 0-2 cardiac risk factors, we like your LDL level to be less than 160, but we may not necessarily initiate medications to 190 unless you cannot lower it. The latest guidelines recommend to consider taking a statin only if your ASCVD cardiac risk score is at least greater than 7.5% and a strong recommendation if your risk score is greater than 10%.     Low LDL: Normal or low LDL is considered good and having an LDL below the normal range is not of a concern.     Complete Blood Count (CBC):    White blood cells: These are infection fighting cells. Mild fluctuations may represent minor illness at the time of blood draw. Marked fluctuations can represent immune diseases. This can also be elevated from smoking.     High WBC: Elevation in wbc can commonly be caused by an infection, steroid use, or any cause of inflammation such as many rheumatologic diseases, surgery, or trauma.      Low WBC: Many different things can cause this such as infections, medications, rheumatologic disorders, malignancies, nutritional deficiencies, and a normal variant in some individuals. If this occurs it is common practice to rule out a few viruses such as  HIV, Hep C, B12, folate along with a a peripheral blood smear to look for abnormalities. If you are otherwise healthy with no concerning symptoms and the workup is negative we usually monitor it with yearly blood work.  If there are other abnormal cell line abnormalities sometimes we refer to hematology to further evaluate.    Hemoglobin: A key indicator for anemia. Ranges depend on age. If you are significantly out of this range, we may need to talk with you.    High Hemoglobin: This can be elevated in some blood disorders, sleep apnea, chronic lung disease, kidney disease, testosterone use, dehydration, smoking, along with some other rare causes.     Low Hemoglobin: Many things can lead to this but the most common cause is an iron deficiency in females who lose blood during their periods, decreased absorption due to antacids, poor diet, sickle cell, anemia of chronic disease, malignancy, bleeding from the gut, along with some other less common causes. If you are a young female who has periods it is usually assumed that this is from that blood loss unless other symptoms or abnormal blood work is present. If you are above 45 and have an iron deficiency it is always recommended to get a colonoscopy to ensure that there is no lesion causing blood loss and to exclude malignancy.     Hematocrit: Another way of looking at anemia, much like your hemoglobin. If you are significantly out of this range, we may need to talk with you.    MCV: This looks at the size of your red blood cells.     High MCV:  A large number may indicate a B-12 deficiency, folate deficiency, alcohol use, liver disease, medication-induced phenomenon, or a need for further workup.    Low MCV: A small number may imply iron anemia or genetic disorder such as alpha-thalassemia. We may check iron studies called to see if you are low.    MCH: Much like MCV above.    Platelets: These are clotting factors. We tolerate a broad range in this. If your numbers  are less than 100, we may need to work it up.     High Platelet Count: If your numbers are elevated, this could represent ongoing inflammation within the body which can be caused by any infection, illness, iron deficiency, or other malignancy. We usually recheck it to monitor for improvement and if it does not resolve will work it up with more blood work.     Low Platelet Count: Many things can cause this such as infections, alcohol use, medications, liver disease, vitamin deficiencies, aspleenia, and some other rare blood disorders. If it persists many times we refer to hematology if a cause is not identified.     Iron:  This is not a reliable blood marker since this fluctuates throughout the day and if you are fasting many times your iron level in your blood is low but this does not necessarily mean you have a deficiency.  There are more reliable ways to measure your iron stores and these are discussed below.    Transferrin:  Many things can cause an abnormal result and this is not as important as some other labs mentioned below.    TIBC:  This is the total iron-binding capacity and this measures the total amount of iron that can be bound by proteins in the blood.  If you have an elevated TIBC this is a good marker that you could be low on iron and this is useful blood marker.  A simple way to think of this is seats in a car. The TIBC is the number of open seats that iron can sit in. If you have a high TIBC (number of open seats) that means you have a low iron level.     Ferritin:  A low ferritin is almost always caused from an iron deficiency.  A normal ferritin level does not need necessarily exclude an iron deficiency since many inflammatory conditions such as kidney disease, diabetes, arthritis, and obesity can raise this level hiding an iron deficiency.  If you are healthy with no inflammatory conditions this is a very reliable test for detecting an iron deficiency since nothing else lowers your ferritin  level except a low iron level. Keep in mind that you can have an iron deficiency if your ferritin level is normal but your TIBC is elevated.  If you have a low iron level the next step is always to identify why you have a low iron level.    CMP (Comprehensive Metabolic Panel):  Broadly, this is a test of organ function including the kidneys, liver, and electrolyte levels.    Glucose: This is primarily looking for diabetes. We like your blood glucose level to be less than 100 when fasting. Readings of 100-125 indicate what we call 'pre-diabetes' or 'glucose intolerance.' This does not necessarily indicate diabetes, but we may check another test called a hemoglobin A1C for confirmation. This level puts you at great risk for becoming a true diabetic and we would encourage the reduction of simple sugars and processed white flour as well as appropriate weight loss. If this number is greater than 125, it is likely you are diabetic; we will get an additional hemoglobin A1C test and will likely schedule you for an appointment. If you notice that your blood glucose is above 125 and we have not scheduled a follow-up appointment, please call us. Patients who are known diabetics can have readings greater than 125.    Urea Nitrogen: This is a kidney function and maybe elevated because of mild dehydration or because of excessive muscle breakdown from aggressive exercise habits.    Creatinine: This also is a kidney function test. It may be mildly elevated if you have particularly large muscle bulk or taking a supplement like creatine. It is related to the GFR. It is a muscle product that we track to look at your kidney function. If this is elevated and new, we may need to talk with you. If you have had this mildly elevated in the past, it is likely that we will just track it to ensure that it does not worsen quickly. Some medications may gently worsen this, namely blood pressure pills called ace inhibitors. To some degree, we will  permit levels of up to 1.6.    Sodium: This is essentially the concentration of salt within the body. This may be mildly low because of dehydration, overhydration, diuretics, .    Potassium: This is an electrolyte that can cause muscular cramping or cardiac difficulties. It is sometimes lowered by diuretic medications.    Chloride: For the most part, this is only relevant if the other electrolytes are abnormal.    CO2: This is a function of acid balance within the body. For the most part, mild abnormalities are not important and may represent a starvation or dehydration state when blood was drawn. Many medications can change this level as well such as diuretics, acetazolamide, calcium carbonate, laxatives, aspirin, and many others.    High CO2: Many things can cause this which includes dehydration, sleep apnea, and COPD.     Low CO2: Many things can lead to a low level and if you are generally healthy we are usually  not concerned. Diarrhea, renal disease, diabetes, and many medications can cause this.     Anion Gap: Only relevant if your CO2 is abnormal.    Calcium: This is not related to dietary intake of calcium. It may fluctuate gently based on the amount of protein within your body. If it is above 10.9, we may need to do additional testing. Many times if low the albumin level is low and once the corrected calcium level is calculated the calcium is in a normal range.     Total protein: This looks at protein within the body. Markedly elevated levels can represent an immune response and may require further workup.    Albumin: This may be elevated because of particularly high level of fitness. If it is markedly suppressed, it may represent organ dysfunction, particularly in the liver or kidneys.    AST and ALT: These are enzymes in the liver and if elevated can indicate liver damage.     Elevated AST/ALT: Many things can caused elevated liver enzymes and the most common reason is viral infections, alcohol use,  medications, supplements, autoimmune, along with some other rare causes. One of the most common reasons for a mild elevation in liver enzymes (less than 3 times the upper limit) is fatty liver disease. Many individuals who have extra fat in their diet store this in the liver and this can build up and cause a mild elevation. This is usually diagnosed with a liver ultrasound and exclusion of other causes. The only treatment for this is diet and exercise along with avoidance of liver toxic medications and alcohol. It is standard of care to rule our viral hepatitis and get imaging of the liver if elevated. This is monitored and if I feel concerned will refer to hepatology.     Alk Phos: Part of liver function or bones    High Alk Phos: elevated levels may indicate a liver injury or obstruction of bile flow. Elevated levels can also be seen in vitamin D deficiency, drug induced, or bone disorders.     Low Alk Phos: In most cases having a low Alkaline Phosphatase enzyme activity is not due to any disease and is simply a normal variant.  Having an elevated Alk Phos is more concerning and associated with many diseases and thus I would not be overly concerned with a low level which is seen in many health individuals. The reasons for a low serum alkaline phosphatase activity were reviewed in a 1-yr retrospective study and in this study it was found that no cause was found in most cases.  Low activity values were recorded in several individuals in the absence of any obvious cause. This would suggest that the definition of the lower limit of the reference range for alkaline phosphatase is arbitrary, thus limiting the use of low serum activity as a marker of disease.  In some cases micronutrients like Zinc (Zn) and Magnesium (Mg) are causes of low ALP activity and you can take zinc or magnesium supplements. Unfortunately, the blood work to measure zinc and magnesium levels are unreliable and not very accurate since it does not  test for the intracellular zinc or magnesium levels.     Gregory MULLIGAN, Jada SOMMERS, Lisa FLORES. Clinical significance of a low serum alkaline phosphatase. Net J Med. 1992 Feb;40(1-2):9-14. PMID: [id number].  Goyo BRUNO, Pa B, Angela I, Behera S, Goyo S. Low Alkaline Phosphatase (ALP) In Adult Population an Indicator of Zinc (Zn) and Magnesium (Mg) Deficiency. Curr Res Nutr Food Sci 2017;5(3). doi : http://dx.[email or web address]    Total Bilirubin: This is also part of liver function.    High T Bili: If you have right upper quadrant pain an elevation in total bilirubin can be caused by a gallbladder stone that is blocking the biliary tract that leads to your gastrointestinal tract. If you have fever and right upper quadrant pain this can sometimes be elevated when your gallbladder is infected and most individuals have nausea with vomiting associated with it. If you have no symptoms and are otherwise healthy this can be caused by Gilbert syndrome which is a benign normal variant. There are other causes such as some anemias but there would be abnormal blood counts.     Low T Bili: Nothing to be concerned about.     Thyroid Stimulating Hormone (TSH): This reading is an indicator of your thyroid function. The thyroid regulates energy levels throughout the entire body, affecting almost every organ system. This is an inverse relationship so a high number actually presents a low thyroid. If this is abnormal, we will often check an additional lab called a Free T4 to evaluate this more carefully. Borderline elevations, those of 5-10, can be watched or worked up further. Please do not take the supplement Biotin for at least a week prior to getting your TSH checked since this can lead to false measurement levels.     Prostate Specific Antigen (PSA): (Men only.) This is a prostate cancer screening test, and is no longer a routine screening test. Levels are truly a function of age. Being less than 4 is typical for  "someone more in their 60s. If you are young, it should probably be less than 3. A higher PSA result does not necessarily mean that you have cancer, but may indicate a need for a discussion with your provider. Options include observation to look at rate of rise or prostate biopsy. Not only is the absolute value important, but how much it has changed from previous years. Please ensure that there is not a dramatic rise from previous years.    Please Note: This information is included as a reference to help you better understand your lab results and is not be used for diagnosis.    Frequently asked questions    When should I take my blood pressure medication?    The latest studies show that taking your blood pressure medication at night is the best time. A recent study showed that this prevents more heart attacks and strokes. See the answer below from [place].     "Q. I've taken blood pressure medicines every morning for many years, and they keep my pressure under control. Recently, my doctor recommended taking them at bedtime, instead. Does that make sense?    A. It actually does make sense -- based on recent research. For many years, there have been at least three theoretical reasons for taking blood pressure medicines before bedtime. First, a body system that strongly affects blood pressure, called the renin-angiotensin system, has its peak activity during sleep. Second, circadian rhythms cause differences in the body chemistry at night compared with daytime. Third, most heart attacks occur in the morning, before medicines taken in the morning have a chance to "kick in."" [6].     When should I take my cholesterol medication?    It used to be recommended to take your cholesterol medication at night since the original statins that lowered cholesterol did not last 24 hours and most cholesterol synthesis is done at night. The long acting statins such as atorvastatin and rosuvastatin last 24 hours so they can be taken " "any time during the day. Simvastatin, pravastatin, fluvastatin, and lovastatin are shorter acting and should be taken at bed time.     Can supplements affect my blood work?    Yes they can. A very important supplement to not take for at least a week prior to your blood work is biotin. "Biotin supplement use is common and can lead to the false measurement of thyroid hormone in commonly used assays." [8.]    What are conditions that should not be addressed during a virtual visit?    There are some conditions that should not be evaluated via a virtual visit since optimal care is impossible. Chest pain, shortness of breath, lung conditions, abdominal pain, and any neurological complaint such as headache, dizziness, numbness ect.         When will I get commentary of my blood work?    I review all blood work that you get and I will send out commentary on this via "[employer] within 72 hours. In most cases you will get a message from me sooner, but many times not all of the blood work is completed thus I usually wait until all results have returned. If there is a critical abnormality you should be contacted the same day you got blood work.     How frequently do I need to have visits to get controlled substances?    It is standard protocol to have a visit every 3 months if you are taking scheduled substances such as ADHD medications, psychiatric medications, and pain medications. This is to ensure your safety and monitor for any side effects.     When should I bring prior to a visit if I want to lose weight?    I recommend that you make a food diary for a week and fill out what you ate each day. You can bring this form in to your visit and I can look over it to suggest changes that you can make.     Which over the counter medications should I avoid if I have decreased kidney function?    NSAIDs which includes ibuprofen (Advil, Motrin, Nuprin), naproxen (Aleve), meloxicam (Mobic) and diclofenac(Zorvolex, Voltaren) and ketorolac " (Toradol) can damage your kidneys if you take this long term.Tylenol does not affect your kidney and thus is safe as long as you don't have liver disease.     Is there an [facility]?     Yes! The [facility] is located on the first floor of the [facility]. The address is listed below. You can get curbside pickup if you call their number at [phone]. One of the many benefits of using the [facility] pharmacy is that the pharmacists can contact me directly if a cheaper alternative is available to save you money. They also see your note to know more about what the medication was prescribed for. I recommend this pharmacy since communication with me is quick in case any confusion arises on your medications.     [facility]  [address]  [place] [address]  Phone: [phone]    Hours:  Monday - Friday  8:00 a.m. - 5:30 a.m.    Why is it not in my best interest to call in order to get an antibiotic?    Medicine is a complex field and many times the correct diagnosis is critical in order to provide the correct care. One of the most important goals of a healthcare provider is to ensure that no dangerous condition is masquerading as a mild illness. Specific questions are very important to obtain during an examination that provide a wealth of information to understand your illness. Health care providers are trained to investigate for signs that can be dangerous to your health. Messaging or calling the office in order to get an antibiotic can be very dangerous.     For example, many urinary tract infections can lead to an infection in the kidney that can result in a serious blood stream infection that can lead to hospitalization if not recognized. A cough can be caused by many different things and not necessarily an infection. It is not uncommon that one assumes a cough is from an infection when it is actually caused by a blood clot in the lungs. This can lead to death. Determining your risk can only be  performed after a thorough history and examination. A few sentences through e-mail is not enough.     What are some common symptoms that should be evaluated by the emergency department and not by phone or e-mail?    This does not include every symptom, but common examples of symptoms that should prompt one to go to the emergency department are chest pain, chest pressure, shortness of breath, difficulty breathing, abdominal pain, weakness or numbness or an extremity, sudden weakness or drooping on one side of the body, speaking difficulty, unusual or bad headache (particularly if it started suddenly), head injury, confusion, seizure, passing out, lightheaded, pain in arm or jaw, suddenly not able to speak, see, walk, or move, dizziness, neck stiffness, suicidal thoughts, testicular pain, cuts and wounds, severe pain, along with many others. This is not an inclusive list.     Outside Records    It is common to have an colonoscopy, mammogram, PAP smear, or eye exam done outside the [facility] system. Many times we do not get the records automatically sent to us. Please call your provider's office to notify them to fax us your records so that we can have the most up to date information. Your provider will review your outside results in order to provide you with the complete care that you deserve. We appreciate that you decided to choose us to be serving on your healthcare team and the more information we have about your health is essential.     If I have a psychiatric crisis what should I do?    If you ever feel that there is a risk of a harm to yourself we recommend to go to the emergency room. There is a National Suicide Prevention lifeline number 3-627-805-TALK which route you to the nearest crisis center. There is also a suicide hotline 6-665-SMFQHJR (1-207.266.7211).    983 has been designated as the new three-digit dialing code that will route callers to the National Suicide Prevention Lifeline (now known as the 984  Suicide & Crisis Lifeline), and is now active across the [place].    When people call, text, or chat 988, they will be connected to trained counselors that are part of the existing Lifeline network. These trained counselors will listen, understand how their problems are affecting them, provide support, and connect them to resources if necessary.    The previous Lifeline phone number (1-957.377.5614) will always remain available to people in emotional distress or suicidal crisis.    The [employer] network of over 200 crisis centers has been in operation since 2005, and has been proven to be effective. Its the counselors at these local crisis centers who answer the contacts the Lifeline receives every day. Numerous studies have shown that callers feel less suicidal, less depressed, less overwhelmed and more hopeful after speaking with a Lifeline counselor.     E-Visits    E-Visits are currently available for three conditions: Urinary tract infections, Sinus symptoms, and rashes.     If you have one of these infections or rash you can fill out a questionnaire that will be sent to your provider who can review and send an appropriate medication. No visit is needed.      What is an E-Visit?    An E-Visit is a way to get care for certain conditions without needing to schedule a virtual visit or to come into the clinic. We'll ask you some clinically based questions about yourself and your symptoms and your provider will evaluate, make a diagnosis and respond with a care plan with recommendations including testing and medications as indicated, just as they would in an in-person setting.  If it becomes clear that you need to be seen virtually or in-person after the E-Visit, we can arrange that.         Should I use an E-Visit?    E-Visits should be used only for non-urgent medical conditions. If you need urgent medical care, please contact your clinic by phone, schedule a same-day appointment online or find a nearby  "[facility]. For serious medical emergencies, please call 911 immediately.         What to expect during an E-Visit   Once you have agreed to an E-Visit, you will be prompted to complete the E-Visit clinical questionnaire in [employer], which can take 10-20 minutes to complete. You may also be asked to confirm your medications.     Since this is a medical evaluation, it is billable to your insurance. Because this is a billable visit, you may also be asked for your insurance details and to enter your credit card information, just as you would for any other visit or co-pay. Much of this is stored in your account, so it should be easy to access or update if needed. You may receive a bill for this service, including any applicable copay amount, after the service is rendered.     Please allow up to 24 business hours for a reply. At any point in the E-Visit process, if you have not received a response within 72 hours, please call your clinic.        To initiate the E-Visit process in [facility], click here.       E-Consults    E-Consults are available when you need to have a specialists opinion on one thing and your PCP agrees to send an E-Consult to answer your question within 48 hours. This not only saves you time but money as well since a visit is not always needed.          Patient Education       Checking Your Blood Pressure at Home   The Basics   Written by the doctors and editors at [facility]   How is blood pressure measured? -- Blood pressure is usually measured with a device that goes around your upper arm. This is often done in a doctor's office. But some people also check their blood pressure themselves, at home or at work.  Blood pressure is explained with 2 numbers. For instance, your blood pressure might be "140 over 90." The first (top) number is the pressure inside your arteries when your heart is belia. The second (bottom) number is the pressure inside your arteries when your heart is relaxed. " "The table shows how doctors and nurses define high and normal blood pressure (table 1).  If your blood pressure gets too high, it puts you at risk for heart attack, stroke, and kidney disease. High blood pressure does not usually cause symptoms. But it can be serious.  What is a home blood pressure meter? -- A home blood pressure meter (or "monitor") is a device you can use to check your blood pressure yourself. It has a cuff that goes around your upper arm (figure 1). Some devices have a cuff that goes around your wrist instead. But doctors aren't sure if these work as well. The meter also has a small screen, or dial, that shows your blood pressure numbers.  There are also special meters you can wear for a day or 2. These are different because they automatically check your blood pressure throughout the day and night, even while you are sleeping. If your doctor thinks you should use one of these devices, they will talk to you about how to wear it.  Why do I need to check my blood pressure at home? -- If your doctor knows or suspects that you have high blood pressure, they might want you to check it at home. There are a few reasons for this. Your doctor might want to look at:  Whether your blood pressure measures the same at home as it did in the doctor's office  How well your blood pressure medicines are working  Changes in your blood pressure, for example, if it goes up and down  People who check their own blood pressure at home usually do better at keeping it low.  How do I choose a home blood pressure meter? -- When choosing a home blood pressure meter, you will probably want to think about:  Cost - Some devices cost more than others. You should also check to see if your insurance will help pay for your device.  Size - It's important to make sure the cuff fits your arm comfortably. Your doctor or nurse can help you with this.  How easy it is to use - You should make sure you understand how to use the device. You " also need to be able to read the numbers on the screen.  You do not need a prescription to buy a home blood pressure meter. You can buy them at most pharmacies or over the internet. Your doctor or nurse can help you choose the right device for you.  How do I check my blood pressure at home? -- Once you have a home blood pressure meter, your doctor or nurse should check it to make sure it fits you and works correctly.  When it's time to check your blood pressure:  Go to the bathroom and empty your bladder first. Having a full bladder can temporarily increase your blood pressure, making the results inaccurate.  Sit in a chair with your feet flat on the ground.  Try to breathe normally and stay calm.  Attach the cuff to your arm. Place the cuff directly on your skin, not over your clothing. The cuff should be tight enough to not slip down, but not uncomfortably tight.  Sit and relax for about 3 to 5 minutes with the cuff on.  Follow the directions that came with your device to start measuring your blood pressure. This might involve squeezing the bulb at the end of the tube to inflate the cuff (fill it with air). With some monitors, you just need to press a button to inflate the cuff. When the cuff fills with air, it feels like someone is squeezing your arm, but it should not hurt. Then you will slowly deflate the cuff (let the air out of it), or it will deflate by itself. The screen or dial will show your blood pressure numbers.  Stay seated and relax for 1 minute, then measure your blood pressure again.  How often should I check my blood pressure? -- It depends. Different people need to follow different schedules. Your doctor or nurse will tell you how often to check your blood pressure, and when. Some people need to check their blood pressure twice a day, in the morning and evening.  Your doctor or nurse will probably tell you to keep track of your blood pressure for at least a few days (table 2). Then they will look  "at the numbers. The reason for this is that it's normal for your blood pressure to change a bit from day to day. For example, the numbers might change depending on whether you recently had caffeine, just exercised, or feel stressed. Checking your blood pressure over several days - or longer - will give your doctor or nurse a better idea of what is average for you.  How should I keep track of my blood pressure? -- Some blood pressure meters will record your numbers for you, or send them to your computer or smartphone. If yours does not do this, you will need to write them down. Your doctor or nurse can help you figure out the best way to keep track of the numbers.  What if my blood pressure is high? -- Your doctor or nurse will tell you what to do if your blood pressure is high when you check it at home. If you get a number that is higher than normal, measure it again to see if it is still high. If it is very high (above a certain number, which your doctor or nurse will tell you to watch out for), you should call your doctor right away.  If your blood pressure is only a little high, your doctor or nurse might tell you to keep checking it for a few more days or weeks, and then call if it does not go back down. Then they can help you decide what to do next.  All topics are updated as new evidence becomes available and our peer review process is complete.  This topic retrieved from [employer] on: Sep 21, 2021.  Topic 605826 Version 4.0  Release: 29.4.2 - C29.263  © 2021 UpToDate, Inc. and/or its affiliates. All rights reserved.  table 1: Definition of normal and high blood pressure  Level  Top number  Bottom number    High 130 or above 80 or above   Elevated 120 to 129 79 or below   Normal 119 or below 79 or below   These definitions are from the American College of Cardiology/American Heart Association. Other expert groups might use slightly different definitions.  "Elevated blood pressure" is a term doctor or nurses use " as a warning. It means you do not yet have high blood pressure, but your blood pressure is not as low as it should be for good health.  Graphic 51485 Version 6.0  figure 1: Using a home blood pressure meter     This is an example of a person using a home blood pressure meter.  Graphic 792670 Version 1.0    Consumer Information Use and Disclaimer   This information is not specific medical advice and does not replace information you receive from your health care provider. This is only a brief summary of general information. It does NOT include all information about conditions, illnesses, injuries, tests, procedures, treatments, therapies, discharge instructions or life-style choices that may apply to you. You must talk with your health care provider for complete information about your health and treatment options. This information should not be used to decide whether or not to accept your health care provider's advice, instructions or recommendations. Only your health care provider has the knowledge and training to provide advice that is right for you. The use of this information is governed by the [employer] End User License Agreement, available at https://www.[employer]/en/solutions/[employer]/about/manuela.The use of [employer] content is governed by the [employer] Terms of Use. ©2021 UpToDate, Inc. All rights reserved.  Copyright   © 2021 UpToDate, Inc. and/or its affiliates. All rights reserved.      Daily Blood Pressure Log    Please print this form to assist you in keeping track of your blood pressure at home.      Name:  Date of Birth:       Average Blood Pressure:           Date: Time  (a.m.) Blood  Pressure: Pulse  Rate: Time  (p.m.) Blood  Pressure : Pulse  Rate: Comments:   Sample 8:37 127/83 84    Stressful morning                                                                                                                                                                                                      Wishing you good health,     Freedom Rodriguez MD     References:  1-https://www.[employer]/speakers/emigdio_miyas  2-https://www.[employer].com.au/news/yhhya-fnf-12-cancers-that-can-la-mclawq-nu-smoking/  3-https://www.cdc.gov/cancer/lung/basic_info/screening.htm  4-https://newsroom.heart.org/news/common-hypertension-medications-may-reduce-colorectal-cancer-risk  5-Cristiano A, Jaime M, Sawada N, et al. High hemoglobin A1c levels within the non-diabetic range are associated with the risk of all cancers. Int J Cancer. 2016;138(7):4789-7051. doi:10.1002/ijc.31381  6-https://www.health.[employer].edu/newsletter_article/the-10-commandments-of-cancer-prevention  7-https://www.health.[employer].edu/diseases-and-conditions/should-i-take-blood-pressure-medications-at-night  8-Elinor CAROLINA et al 2018 Prevalence of biotin supplement usage in outpatients and plasma biotin concentrations in patients presenting to the emergency department. Clin Biochem. Epub 2018 Jul 20. PMID: [id number].

## 2023-12-19 ENCOUNTER — PATIENT MESSAGE (OUTPATIENT)
Dept: FAMILY MEDICINE | Facility: CLINIC | Age: 23
End: 2023-12-19
Payer: OTHER GOVERNMENT

## 2023-12-19 DIAGNOSIS — E61.1 IRON DEFICIENCY: Primary | ICD-10-CM

## 2023-12-19 RX ORDER — FERROUS SULFATE 325(65) MG
325 TABLET, DELAYED RELEASE (ENTERIC COATED) ORAL DAILY
Qty: 90 TABLET | Refills: 3 | Status: SHIPPED | OUTPATIENT
Start: 2023-12-19 | End: 2024-03-18

## 2023-12-21 ENCOUNTER — LAB VISIT (OUTPATIENT)
Dept: LAB | Facility: HOSPITAL | Age: 23
End: 2023-12-21
Payer: OTHER GOVERNMENT

## 2023-12-21 ENCOUNTER — OFFICE VISIT (OUTPATIENT)
Dept: ALLERGY | Facility: CLINIC | Age: 23
End: 2023-12-21
Payer: OTHER GOVERNMENT

## 2023-12-21 VITALS — WEIGHT: 168.63 LBS | HEIGHT: 69 IN | BODY MASS INDEX: 24.98 KG/M2

## 2023-12-21 DIAGNOSIS — J31.0 RHINITIS, UNSPECIFIED TYPE: ICD-10-CM

## 2023-12-21 DIAGNOSIS — Z84.89 FAMILY HISTORY OF ATOPY IN MOTHER: ICD-10-CM

## 2023-12-21 DIAGNOSIS — E73.9 LACTOSE INTOLERANCE: Primary | ICD-10-CM

## 2023-12-21 DIAGNOSIS — T78.1XXD ADVERSE FOOD REACTION, SUBSEQUENT ENCOUNTER: ICD-10-CM

## 2023-12-21 PROCEDURE — 99999 PR PBB SHADOW E&M-EST. PATIENT-LVL III: ICD-10-PCS | Mod: PBBFAC,,, | Performed by: STUDENT IN AN ORGANIZED HEALTH CARE EDUCATION/TRAINING PROGRAM

## 2023-12-21 PROCEDURE — 99204 OFFICE O/P NEW MOD 45 MIN: CPT | Mod: S$PBB,,, | Performed by: STUDENT IN AN ORGANIZED HEALTH CARE EDUCATION/TRAINING PROGRAM

## 2023-12-21 PROCEDURE — 86003 ALLG SPEC IGE CRUDE XTRC EA: CPT | Performed by: STUDENT IN AN ORGANIZED HEALTH CARE EDUCATION/TRAINING PROGRAM

## 2023-12-21 PROCEDURE — 99213 OFFICE O/P EST LOW 20 MIN: CPT | Mod: PBBFAC | Performed by: STUDENT IN AN ORGANIZED HEALTH CARE EDUCATION/TRAINING PROGRAM

## 2023-12-21 PROCEDURE — 36415 COLL VENOUS BLD VENIPUNCTURE: CPT | Performed by: STUDENT IN AN ORGANIZED HEALTH CARE EDUCATION/TRAINING PROGRAM

## 2023-12-21 PROCEDURE — 99204 PR OFFICE/OUTPT VISIT, NEW, LEVL IV, 45-59 MIN: ICD-10-PCS | Mod: S$PBB,,, | Performed by: STUDENT IN AN ORGANIZED HEALTH CARE EDUCATION/TRAINING PROGRAM

## 2023-12-21 PROCEDURE — 99999 PR PBB SHADOW E&M-EST. PATIENT-LVL III: CPT | Mod: PBBFAC,,, | Performed by: STUDENT IN AN ORGANIZED HEALTH CARE EDUCATION/TRAINING PROGRAM

## 2023-12-21 PROCEDURE — 86003 ALLG SPEC IGE CRUDE XTRC EA: CPT | Mod: 59 | Performed by: STUDENT IN AN ORGANIZED HEALTH CARE EDUCATION/TRAINING PROGRAM

## 2023-12-21 NOTE — PROGRESS NOTES
"  ALLERGY & IMMUNOLOGY CLINIC   HISTORY OF PRESENT ILLNESS   Referral from: No ref. provider found  CC: No chief complaint on file.  CC: allergies    HPI: Annmarie Pennington is a 23 y.o. female    Avoids lactose  Ok with lactaid ice cream  Has about 2 tablespoons of creamer a day in her coffee which she tolerates  Has tried avoiding certain foods then reintroducing them  Has no issues with any foods  No allergic reactions  She has no nose/eye symptoms (asymptomatic)  Just graduated a semester early and thinking of going into healthcare  She is curious if she has any allergies    Had contact dermatitis during last week or school that has only happened once, started on outer right leg with red blotches from hip to knee which spread locally but did not move around, has fully resolved    She has migraines, along with her dad, certain smells trigger it (strong odors) left nostril gets clogged then left eye then continues  Mom has "whole body swelling" to wasp stings and large local reactions mosquitos, and mom has rosacea    Asthma/bronchitis: No  Eczema: No   Rhinitis: No   GERD: No  Oral Allergy: No   Food Allergy: No   Venom Allergy: No   Latex Allergy: No   Infection Hx: No  Antibiotic courses: No  Urticaria: No     Env/Occ:   Pets: 1 dog and 1 cat    Drug Allergies: Review of patient's allergies indicates:  No Known Allergies      MEDICAL HISTORY   MedHx:   Patient Active Problem List   Diagnosis    Plantar fasciitis, bilateral    Tarsal coalition    Developmental breast asymmetry       SurgHx:  No past surgical history on file.    Medications:   Current Outpatient Medications on File Prior to Visit   Medication Sig Dispense Refill    drospirenone-ethinyl estradioL (KRISTEL) 3-0.03 mg per tablet Take 1 tablet by mouth once daily.      ferrous sulfate 325 (65 FE) MG EC tablet Take 1 tablet (325 mg total) by mouth once daily. 90 tablet 3    hydrOXYzine HCL (ATARAX) 10 MG Tab Take 1 tablet (10 mg total) by mouth 3 " (three) times daily as needed (acute anxiety/panic ----- CAUTION ON SEDATION). 15 tablet 0    topiramate (TOPAMAX) 25 MG tablet Take 2 tablets (50 mg total) by mouth every evening. 60 tablet 2     No current facility-administered medications on file prior to visit.      PHYSICAL EXAM   VS: LMP 11/22/2023 (Approximate)   GENERAL: NAD, well nourished, well appearing  EYES: no conjunctival injection, no discharge, no infraorbital shiners  EARS: external auditory canals normal B/L, TM normal B/L  NOSE: NT pink 2+ B/L, no polyps  ORAL: MMM, no ulcers, no thrush, +cobblestoning  LUNGS: CTAB, no w/r/c, no increased WOB  DERM: no rashes   ALLERGEN TESTING   Skin Prick: Tried doing histamine but negative (took hydroxyzine 2 days ago x 1)  Immunocaps: Ordered   ASSESSMENT & PLAN     Annmarie Pennington is a 23 y.o. female with     Rhinitis  - Patient and I both suspect she will have negative testing; she has a cat and a dog (no issues)  - Does not need any medications  - Allergy testing ordered today, discussed skin vs. Blood, took atarax 2 days ago and had negative histamine control thus opted for blood testing, completed for patient curiosity  - If this ever becomes problematic you could consider azelastine nose spray, or flonase nose spray, or ocean spray (saline)    Lactose intolerance  - Great job trying lactaid ice cream and finding out you can tolerate this, and are continuing to tolerate some lactose daily which can help maintain tolerance  - No further testing indicated    Adverse reaction to food: curious about food intolerances (asymptomatic)  - Unfortunately we do not have good testing for food intolerance, thus we can try and be our own scientists and strictly avoid a food for 3 days, then eating it on the 4th day, and seeing if you notice any difference    Mom has allergy to wasp venom   - While atopy in general in genetic, specific allergies are not genetic    Follow up: PRN  ----------------  Patient  information  Rhinitis (inflammation of the nose)    - ? of people with nose symptoms have allergies (allergic rhinitis), which we check for with skin or blood testing. Symptoms: Typically this has itching of the nose, eyes, ears, and throat. There can also be increased secretions (mucus), and dry/itchy eyes.   - ? of people with very similar symptoms of allergies do not have any when checked (nonallergic rhinitis). Symptoms: Typically this has post nasal drip.  The most common cause of this is GERD (acid reflux), due to the reflux/reflex theory. This can be stubborn to treat and despite medication that suppresses acid (PPIs), the underlying issue is not corrected which is that food refluxes up into the esophagus. When this happens, your body tries to protect the esophagus by making mucus from the sinuses.   Other causes of nonallergic rhinitis include changes in weather or temperature or humidity (vasomotor rhinitis), foods (gustatory rhinitis), hormones, aging, and sensitivity to fumes/scents.   - ? of people have both (mixed rhinitis)    Regardless of cause, treatments are similar as follows:     Medications  - Flonase (nose spray) up to 2 squirts each nostril twice a day, as needed. This is a mild steroid so can take a few days to work, which decreases swelling/congestion.  - Azelastine (nose spray) up to 2 squirts each nostril twice a day, as needed. This is an antihistamine so it works in 30 minutes and decreases mucus production/itch thus can replace an oral antihistamine; however, some people cannot tolerate the bitter taste.   - Dymista: a combination of flonase/azelastine that is usually not covered by insurance (which is why we prescribe them separately)  - Tips for nose sprays:   If you use flonase with azealstine, they seem to work better than using either one by itself. However if you use just one and have control of your symptoms, there is no need to use the second one.  Technique: Remember to aim  outwards (not to the middle of your nose, otherwise you can hit your nasal septum and cause irritation/bleeding). If nose irritation occurs please take a break, you could also try applying some petroleum jelly to the inside of your nostrils. If you have medication dripping into your throat this is not helpful, and you can reduce the number of sprays (example: spray only 1 nostril), or you can bend your head forward and look at your toes when you spray, wait a few seconds, and gently blow your nose to remove excess medication, before straightening your head.  - In lieu of azelastine can use 1 tab of oral antihistamine daily such as cetirizine (zyrtec), levocetirizine (xyzal), loratadine (claritin), desloratadine (clarinex), or fexofenadine (allegra). We do not recommend benadryl as while this can help dry up secretions (has some antimuscarinic properties), and frequent use may increase risk of dementia.    Eye itching/dryness  - You can try olopatadine 0.1%, ketotifen 0.035%, or azelastine eye drops, all twice a day, as needed  - Avoid Visine which isn't good for your eyes to use frequently    Post nasal drip  - If your are having post nasal drip you can also try sinus rinse (like a netipot) using sterile water daily. This can be distilled, or boiled and cooled water. Do not use tap water due to risk of contamination from organisms (amoeba).  - Saline spray (ocean spray) can help hydrate and thin out mucus so you dont feel it in your throat  - Guaifenesin (mucinex) 600mg extended release used twice a day as needed is a mucolytic and can help thin out secretions. Make sure to take it with a large glass of water. I do not recommend mucinex-DM (DM=dextromethorphan is a cough suppressant).    Congestion  - Pseudophed may help decrease congestion. Do not take if you have issues with high blood pressure or prostate (BPH).   - Oxymetazoline (Afrin) nasal spray used up to maximum 4 times a month can help as rescue for  congestion, like before flying (as you are doing). There is a common side effect called rebound congestion, where your nose gets much more congested, if you use it more frequently. Using it with Flonase (or another nasal steroid) seems to help prevent this.    Dripping nose like a faucet  - Ipratropium nasal spray (atrovent) used every 6 hours as needed can help dry up secretions.  - It is important to let us know if this started after a traumatic injury like a car accident    Scent sensitivity  - Common in both kids and adults, and can make nose and breathing symptoms worse  - Try and avoid perfumes, fragrance, air fresheners in the home and in the car (including wall plug-ins), smoke/vape exposure, candles, sprays like Febreeze and lysol, dryer sheets, fabric softeners, scented detergents (use unscented/free and clear ones), and please wash new clothing/bedding/towels before first use. These all off-put gasses that are irritating to the nose and airway.  - Volatile organic compounds can also cause this, for example new carpeting or a new car. These dissipate over time (6 months or so). If this is an issue you can try sinus  (with capsaicin) which will burn when you spray it in your nose and dulls the nerves temporarily.    Next steps  - If you are allergic, and think allergies are causing your symptoms, allergy shots or pills may be an option depending on your allergy results and symptom control on medications. This is a 3-5 year commitment. Allergy shots are only given in the clinic and this is first weekly, then monthly. Allergy pills are only for grass, dust mite, and ragweed allergy. The first pill is given in clinic (30 minute appointment) and then the rest are taken daily at home for 3 years.

## 2023-12-27 LAB
A ALTERNATA IGE QN: <0.1 KU/L
A FUMIGATUS IGE QN: <0.1 KU/L
BERMUDA GRASS IGE QN: <0.1 KU/L
CAT DANDER IGE QN: <0.1 KU/L
CEDAR IGE QN: <0.1 KU/L
D FARINAE IGE QN: <0.1 KU/L
D PTERONYSS IGE QN: <0.1 KU/L
DEPRECATED A ALTERNATA IGE RAST QL: NORMAL
DEPRECATED A FUMIGATUS IGE RAST QL: NORMAL
DEPRECATED BERMUDA GRASS IGE RAST QL: NORMAL
DEPRECATED CAT DANDER IGE RAST QL: NORMAL
DEPRECATED CEDAR IGE RAST QL: NORMAL
DEPRECATED D FARINAE IGE RAST QL: NORMAL
DEPRECATED D PTERONYSS IGE RAST QL: NORMAL
DEPRECATED DOG DANDER IGE RAST QL: NORMAL
DEPRECATED ELDER IGE RAST QL: NORMAL
DEPRECATED ENGL PLANTAIN IGE RAST QL: NORMAL
DEPRECATED PECAN/HICK TREE IGE RAST QL: NORMAL
DEPRECATED ROACH IGE RAST QL: NORMAL
DEPRECATED TIMOTHY IGE RAST QL: NORMAL
DEPRECATED WEST RAGWEED IGE RAST QL: NORMAL
DEPRECATED WHITE OAK IGE RAST QL: NORMAL
DOG DANDER IGE QN: <0.1 KU/L
ELDER IGE QN: <0.1 KU/L
ENGL PLANTAIN IGE QN: <0.1 KU/L
PECAN/HICK TREE IGE QN: <0.1 KU/L
ROACH IGE QN: <0.1 KU/L
TIMOTHY IGE QN: <0.1 KU/L
WEST RAGWEED IGE QN: <0.1 KU/L
WHITE OAK IGE QN: <0.1 KU/L

## 2023-12-27 RX ORDER — DROSPIRENONE AND ETHINYL ESTRADIOL 0.03MG-3MG
1 KIT ORAL
Qty: 84 TABLET | Refills: 1 | Status: SHIPPED | OUTPATIENT
Start: 2023-12-27 | End: 2024-03-15 | Stop reason: SDUPTHER

## 2023-12-28 ENCOUNTER — OFFICE VISIT (OUTPATIENT)
Dept: PODIATRY | Facility: CLINIC | Age: 23
End: 2023-12-28
Payer: OTHER GOVERNMENT

## 2023-12-28 VITALS — HEIGHT: 69 IN | BODY MASS INDEX: 24.88 KG/M2 | WEIGHT: 168 LBS

## 2023-12-28 DIAGNOSIS — R26.2 DIFFICULTY WALKING: ICD-10-CM

## 2023-12-28 DIAGNOSIS — M77.8 EXTENSOR TENDONITIS OF FOOT: Primary | ICD-10-CM

## 2023-12-28 DIAGNOSIS — M79.671 BILATERAL FOOT PAIN: ICD-10-CM

## 2023-12-28 DIAGNOSIS — M79.672 BILATERAL FOOT PAIN: ICD-10-CM

## 2023-12-28 PROCEDURE — 99203 OFFICE O/P NEW LOW 30 MIN: CPT | Mod: S$GLB,,, | Performed by: PODIATRIST

## 2024-01-08 NOTE — PROGRESS NOTES
Subjective:     Patient ID: Annmarie Pennington is a 23 y.o. female.    Chief Complaint: Foot Problem    Annmarie is a 23 y.o. female who presents to the podiatry clinic  with complaint of  bilateral foot pain. Onset of the symptoms was several weeks ago. 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 Current symptoms include: worsening symptoms after a period of activity. Aggravating factors:  prolonged standing . Symptoms have stabilized. Patient has had prior foot problems. Evaluation to date: plain films: normal. Treatment to date: none. Patients rates pain 0/10 on pain scale.  Patient reports previous diagnosis of possible tarsal coalition in 2019.  Patient states she has not having much pain from this area at this point    Review of Systems   Constitutional: Negative for chills and fever.   Cardiovascular:  Negative for chest pain and leg swelling.   Respiratory:  Negative for cough and shortness of breath.    Gastrointestinal:  Negative for diarrhea, nausea and vomiting.        Objective:     Physical Exam  Vitals reviewed.   Constitutional:       General: She is not in acute distress.     Appearance: Normal appearance. She is not ill-appearing.   HENT:      Head: Normocephalic.      Nose: Nose normal.   Cardiovascular:      Rate and Rhythm: Normal rate.   Pulmonary:      Effort: Pulmonary effort is normal. No respiratory distress.   Skin:     Capillary Refill: Capillary refill takes 2 to 3 seconds.   Neurological:      Mental Status: She is alert and oriented to person, place, and time.   Psychiatric:         Mood and Affect: Mood normal.         Behavior: Behavior normal.         Thought Content: Thought content normal.         Judgment: Judgment normal.       Neurologic:  Protective and light touch sensation intact bilateral lower extremity  Musculoskeletal:  5/5 muscle strength noted bilateral foot, ankle joint range of motion is within  normal limits, minimal tenderness with dorsiflexion plantar flexion bilateral lower extremity, no pain and tenderness with palpation of the dorsal tarsal bones bilateral lower extremity  Vascular:  DP and PT pulses palpable 2/4 bilateral foot, capillary fill time less 3 seconds to distal aspect of the digits bilateral foot  Dermatologic:  No open lesions noted bilateral foot, no rashes noted bilateral foot, no interdigital maceration noted bilateral foot    Assessment:      Encounter Diagnoses   Name Primary?    Extensor tendonitis of foot Yes    Bilateral foot pain     Difficulty walking      Plan:     Annmarie was seen today for foot problem.    Diagnoses and all orders for this visit:    Extensor tendonitis of foot    Bilateral foot pain    Difficulty walking      I counseled the patient on her conditions, their implications and medical management.        1. Patient was examined and evaluated.    2. Discussed with patient etiology of extensor tendinitis.  Patient made aware that symptoms may have been related to overuse.  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  Patient was advised to perform recovery based exercises for improvement of foot following long periods of prolonged standing.  3. Reviewed patient's previous radiographs and discussed potential tarsal coalitions.  Patient made aware of radiographs were negative for are tarsal coalition.  Discussed with patient possible repeat radiographs at this point to see if any changes have occurred.  Patient was made aware that secondary to improve pain complaints this can be delayed.  4. Patient will follow-up p.r.n. foot complaints

## 2024-03-07 ENCOUNTER — OFFICE VISIT (OUTPATIENT)
Dept: PODIATRY | Facility: CLINIC | Age: 24
End: 2024-03-07
Payer: OTHER GOVERNMENT

## 2024-03-07 ENCOUNTER — HOSPITAL ENCOUNTER (OUTPATIENT)
Dept: RADIOLOGY | Facility: HOSPITAL | Age: 24
Discharge: HOME | End: 2024-03-07
Attending: PODIATRIST
Payer: OTHER GOVERNMENT

## 2024-03-07 VITALS
SYSTOLIC BLOOD PRESSURE: 125 MMHG | WEIGHT: 164 LBS | HEIGHT: 69 IN | HEART RATE: 70 BPM | DIASTOLIC BLOOD PRESSURE: 87 MMHG | BODY MASS INDEX: 24.29 KG/M2

## 2024-03-07 DIAGNOSIS — M79.671 FOOT PAIN, BILATERAL: Primary | ICD-10-CM

## 2024-03-07 DIAGNOSIS — M79.672 FOOT PAIN, BILATERAL: ICD-10-CM

## 2024-03-07 DIAGNOSIS — M79.671 FOOT PAIN, BILATERAL: ICD-10-CM

## 2024-03-07 DIAGNOSIS — M79.672 FOOT PAIN, BILATERAL: Primary | ICD-10-CM

## 2024-03-07 DIAGNOSIS — M72.2 PLANTAR FASCIITIS, BILATERAL: ICD-10-CM

## 2024-03-07 PROCEDURE — 99999 PR PBB SHADOW E&M-EST. PATIENT-LVL III: CPT | Mod: PBBFAC,,, | Performed by: PODIATRIST

## 2024-03-07 PROCEDURE — 73630 X-RAY EXAM OF FOOT: CPT | Mod: 26,50,, | Performed by: RADIOLOGY

## 2024-03-07 PROCEDURE — 99213 OFFICE O/P EST LOW 20 MIN: CPT | Mod: PBBFAC,25,PN | Performed by: PODIATRIST

## 2024-03-07 PROCEDURE — 99213 OFFICE O/P EST LOW 20 MIN: CPT | Mod: S$PBB,,, | Performed by: PODIATRIST

## 2024-03-07 PROCEDURE — 73630 X-RAY EXAM OF FOOT: CPT | Mod: TC,50,PN

## 2024-03-10 NOTE — PROGRESS NOTES
Subjective:       Patient ID: Annmarie Pennington is a 23 y.o. female.    Chief Complaint: Results (Xray )  Patient presents today she states that she was diagnosed with tarsal coalitions in both feet in 2016.  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 No pertinent surgical history.  Family History   Problem Relation Age of Onset    Cancer Mother     Rosacea Mother     Hypertension Father     Hyperlipidemia Father     Breast cancer Maternal Grandmother     Diabetes Paternal Grandmother     Diabetes Paternal Grandfather     Colon cancer Neg Hx     Ovarian cancer Neg Hx      Social History     Socioeconomic History    Marital status: Single   Tobacco Use    Smoking status: Former     Types: Vaping with nicotine     Passive exposure: Never    Smokeless tobacco: Never   Substance and Sexual Activity    Alcohol use: Yes     Comment: Socially    Drug use: No    Sexual activity: Yes     Partners: Male     Birth control/protection: OCP   Social History Narrative    Lives with parents, 2 half sisters, that live with mother's.    1 dog, 2 cats.    Attends [employer] - 9th     Social Determinants of Health     Financial Resource Strain: Low Risk  (11/10/2023)    Overall Financial Resource Strain (CARDIA)     Difficulty  of Paying Living Expenses: Not hard at all   Food Insecurity: No Food Insecurity (11/10/2023)    Hunger Vital Sign     Worried About Running Out of Food in the Last Year: Never true     Ran Out of Food in the Last Year: Never true   Transportation Needs: No Transportation Needs (11/10/2023)    PRAPARE - Transportation     Lack of Transportation (Medical): No     Lack of Transportation (Non-Medical): No   Physical Activity: Insufficiently Active (11/10/2023)    Exercise Vital Sign     Days of Exercise per Week: 3 days     Minutes of Exercise per Session: 40 min   Stress: Stress Concern Present (11/10/2023)    [employer] - Occupational Stress Questionnaire     Feeling of Stress : To some extent   Social Connections: Unknown (11/10/2023)    Social Connection and Isolation Panel [NHANES]     Frequency of Communication with Friends and Family: More than three times a week     Frequency of Social Gatherings with Friends and Family: More than three times a week     Active Member of Clubs or Organizations: Yes     Attends Club or Organization Meetings: More than 4 times per year     Marital Status: Never    Housing Stability: Low Risk  (11/10/2023)    Housing Stability Vital Sign     Unable to Pay for Housing in the Last Year: No     Number of Places Lived in the Last Year: 2     Unstable Housing in the Last Year: No       Current Outpatient Medications   Medication Sig Dispense Refill    drospirenone-ethinyl estradioL (RAFFI) 3-0.03 mg per tablet TAKE 1 TABLET BY MOUTH EVERY DAY 84 tablet 1    ferrous sulfate 325 (65 FE) MG EC tablet Take 1 tablet (325 mg total) by mouth once daily. 90 tablet 3     No current facility-administered medications for this visit.     Review of patient's allergies indicates:  No Known Allergies    Review of Systems   Musculoskeletal:  Positive for arthralgias.   All other systems reviewed and are negative.      Objective:      Vitals:    03/07/24 0953   BP:  "125/87   BP Location: Right forearm   Patient Position: Sitting   Pulse: 70   Weight: 74.4 kg (164 lb)   Height: 5' 9" (1.753 m)     Physical Exam  Vitals and nursing note reviewed.   Constitutional:       Appearance: Normal appearance.   Cardiovascular:      Pulses:           Dorsalis pedis pulses are 2+ on the right side and 2+ on the left side.        Posterior tibial pulses are 2+ on the right side and 2+ on the left side.   Pulmonary:      Effort: Pulmonary effort is normal.   Musculoskeletal:         General: Normal range of motion.      Right foot: Bunion present.      Left foot: Bunion present.        Feet:    Feet:      Right foot:      Protective Sensation: 2 sites tested.  2 sites sensed.      Skin integrity: Skin integrity normal.      Left foot:      Protective Sensation: 2 sites tested.  2 sites sensed.      Skin integrity: Skin integrity normal.   Skin:     General: Skin is warm.      Capillary Refill: Capillary refill takes less than 2 seconds.   Neurological:      General: No focal deficit present.      Mental Status: She is alert.   Psychiatric:         Mood and Affect: Mood normal.         Behavior: Behavior normal.                                    Assessment:       1. Foot pain, bilateral    2. Plantar fasciitis, bilateral        Plan:       Patient presents today she states that she was diagnosed with tarsal coalitions in both feet in 2016.  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  Comprehensive new patient evaluation was performed today patient had x-rays performed that " I reviewed with her in detail patient has very mild bunion deformity with slightly increased intermetatarsal angle bilateral but this is not currently causing the patient any pain or discomfort on evaluation of x-rays patient has no signs of any tarsal coalition on either of her feet I have also reviewed x-rays that were taken back in 2016 there is no tarsal coalition noted on these x-rays either they x-rays in 2016 were read as normal by Radiology.  I did advised the patient the discomfort she is having is related to the plantar fascia it is on the bottom of her feet it is worse with activity and I have advised the patient it is important that she has appropriate support at all times.  Incidental findings on x-ray where a bone island of the calcaneus left this was noted on her most current x-rays as well as x-rays from 2016.  Patient was fitted for and dispensed power step full length arch supports women size 9 I have advised the patient this is going to give her much more support than what she is currently getting she is wearing good [place] running shoes but I have advised her clearly this is not enough support she does have some hypermobility of the 1st ray which is consistent with bunion deformity I explained to the patient the hypermobility allows her foot to pronate when weight-bearing it is important that her foot be supported properly to limit the amount of pronation thus supporting the plantar fascia.  Patient advised the power step full length arch support should make a considerable difference if not completely eliminate the pain she is having on the bottom of her feet I did advise her we may need to build these up adding more support to these as tolerated depending upon how well she tolerates these over the next 2-3 weeks.  Recommended follow-up as needed patient advised if she still having any problems questions or concerns to contact us for follow-up.This note was created using M*Modal voice recognition  software that occasionally misinterpreted phrases or words.

## 2024-03-15 ENCOUNTER — OFFICE VISIT (OUTPATIENT)
Dept: FAMILY MEDICINE | Facility: CLINIC | Age: 24
End: 2024-03-15
Payer: OTHER GOVERNMENT

## 2024-03-15 ENCOUNTER — LAB VISIT (OUTPATIENT)
Dept: LAB | Facility: HOSPITAL | Age: 24
End: 2024-03-15
Attending: FAMILY MEDICINE
Payer: OTHER GOVERNMENT

## 2024-03-15 VITALS
SYSTOLIC BLOOD PRESSURE: 100 MMHG | HEART RATE: 76 BPM | RESPIRATION RATE: 18 BRPM | OXYGEN SATURATION: 97 % | DIASTOLIC BLOOD PRESSURE: 68 MMHG | BODY MASS INDEX: 24.59 KG/M2 | HEIGHT: 69 IN | WEIGHT: 166 LBS

## 2024-03-15 DIAGNOSIS — Z11.59 NEED FOR HEPATITIS B SCREENING TEST: ICD-10-CM

## 2024-03-15 DIAGNOSIS — Z11.1 SCREENING-PULMONARY TB: ICD-10-CM

## 2024-03-15 DIAGNOSIS — Z30.09 FAMILY PLANNING: Primary | ICD-10-CM

## 2024-03-15 DIAGNOSIS — Z01.84 IMMUNITY STATUS TESTING: ICD-10-CM

## 2024-03-15 DIAGNOSIS — F41.9 ANXIETY: ICD-10-CM

## 2024-03-15 DIAGNOSIS — Z23 NEED FOR VACCINATION: ICD-10-CM

## 2024-03-15 PROCEDURE — 99999 PR PBB SHADOW E&M-EST. PATIENT-LVL III: CPT | Mod: PBBFAC,,, | Performed by: FAMILY MEDICINE

## 2024-03-15 PROCEDURE — 90715 TDAP VACCINE 7 YRS/> IM: CPT | Mod: PBBFAC,PN

## 2024-03-15 PROCEDURE — 99999PBSHW TDAP VACCINE GREATER THAN OR EQUAL TO 7YO IM: Mod: PBBFAC,,,

## 2024-03-15 PROCEDURE — 99213 OFFICE O/P EST LOW 20 MIN: CPT | Mod: PBBFAC,PN | Performed by: FAMILY MEDICINE

## 2024-03-15 PROCEDURE — 90471 IMMUNIZATION ADMIN: CPT | Mod: PBBFAC,PN

## 2024-03-15 PROCEDURE — 36415 COLL VENOUS BLD VENIPUNCTURE: CPT | Performed by: FAMILY MEDICINE

## 2024-03-15 PROCEDURE — 99395 PREV VISIT EST AGE 18-39: CPT | Mod: S$PBB,,, | Performed by: FAMILY MEDICINE

## 2024-03-15 PROCEDURE — 86706 HEP B SURFACE ANTIBODY: CPT | Performed by: FAMILY MEDICINE

## 2024-03-15 RX ORDER — DROSPIRENONE AND ETHINYL ESTRADIOL 0.03MG-3MG
1 KIT ORAL DAILY
Qty: 84 TABLET | Refills: 3 | Status: SHIPPED | OUTPATIENT
Start: 2024-03-15

## 2024-03-15 RX ORDER — VILAZODONE HYDROCHLORIDE 10 MG/1
10 TABLET ORAL DAILY
Qty: 90 TABLET | Refills: 3 | Status: SHIPPED | OUTPATIENT
Start: 2024-03-15 | End: 2025-03-15

## 2024-03-15 NOTE — PATIENT INSTRUCTIONS
ADHD TESTING RESOURCES    Deonna Barron - [phone] *psychiatrist*  [facility] ([place]) - [phone]  [facility] ([facility])  - [phone]  [facility] ([place]) - [phone]  [facility] ([place]) - [phone]  [facility] ([place]) - [phone]  [facility] ([place])- [phone]      Please ALWAYS ask for their recommendations, if the providers office, or facility is not accepting new patients.       Thank you for allowing me to participate in your care today. It is an honor to be a part of your healthcare team at [facility]. If you had labs ordered today, you will receive notification via [employer], phone call or mailed letter regarding your results within 7 days. If you have any questions or concerns regarding your visit today, please do not hesitate to contact us.  Sincerely,   Betsy Maxwell M.D.

## 2024-03-15 NOTE — LETTER
March 15, 2024      [address], [address]  [place] [address]  Phone: [phone]  Fax: [phone]       Patient: Annmarie Pennington   YOB: 2000  Date of Visit: 03/15/2024    To Whom It May Concern:    Mar Pennington  was at [facility] on 03/15/2024. She has had her annual physical exam and is in good health.     Sincerely,    Betsy Maxwell MD

## 2024-03-15 NOTE — PROGRESS NOTES
Subjective:       Patient ID: Annmarie Pennington is a 23 y.o. female.    Chief Complaint: Annual Exam (Discuss vac. 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   She is needing hep B titers  Meningitis vaccine  Tdap is due    Needs OCP refilled    Has had anxiety in group settings. She took hydroxyzine once- it made her tired but it dumbed down the noise.    When she is in the classroom she feels like she can never focus for the duration of lectures.     Anxiety: Has been on various SSRIs and SNRIs in the past that did not work for her anxiety. Would like to try something different. Discussed vilazodone.         Review of Systems   Constitutional:  Negative for activity change, appetite change, fatigue and fever.   Respiratory:  Negative for shortness of breath.    Gastrointestinal:  Negative for abdominal pain.   Integumentary:  Negative for rash.         Objective:      Physical Exam  Vitals and nursing note reviewed.   Constitutional:       General: She is not in acute distress.     Appearance: She is not ill-appearing.   Cardiovascular:      Rate and Rhythm: Normal rate and regular rhythm.      Heart sounds: No murmur heard.  Pulmonary:      Effort: Pulmonary effort is normal.      Breath sounds: Normal breath sounds. No wheezing.   Skin:     General: Skin is warm and dry.      Findings: No rash.   Neurological:      Mental Status: She is alert.   Psychiatric:         Mood and Affect: Mood normal.         Behavior: Behavior normal.         Assessment:       1. Family planning    2. Screening-pulmonary TB    3. Need for hepatitis B screening test    4. Immunity status testing    5. Need for vaccination    6. Anxiety        Plan:       Problem List Items Addressed This Visit    None  Visit Diagnoses       Family planning    -  Primary    Relevant Medications    drospirenone-ethinyl estradioL (RAFFI) 3-0.03 mg per tablet    Screening-pulmonary TB        Relevant Orders    Quantiferon Gold TB    Need for  hepatitis B screening test        Relevant Orders    Hepatitis B Surface Antibody, Qual/Quant    Immunity status testing        Relevant Orders    Hepatitis B Surface Antibody, Qual/Quant    Need for vaccination        Relevant Orders    (In Office Administered) Tdap Vaccine    Anxiety        Relevant Medications    vilazodone (VIIBRYD) 10 mg Tab tablet

## 2024-03-18 ENCOUNTER — APPOINTMENT (OUTPATIENT)
Dept: LAB | Facility: HOSPITAL | Age: 24
End: 2024-03-18
Attending: FAMILY MEDICINE
Payer: OTHER GOVERNMENT

## 2024-03-18 ENCOUNTER — CLINICAL SUPPORT (OUTPATIENT)
Dept: PEDIATRICS | Facility: CLINIC | Age: 24
End: 2024-03-18
Payer: OTHER GOVERNMENT

## 2024-03-18 DIAGNOSIS — Z23 ENCOUNTER FOR IMMUNIZATION: Primary | ICD-10-CM

## 2024-03-18 LAB
HBV SURFACE AB SER QL IA: NEGATIVE
HBV SURFACE AB SERPL IA-ACNC: 7 MIU/ML

## 2024-03-18 PROCEDURE — 90620 MENB-4C VACCINE IM: CPT | Mod: PBBFAC

## 2024-03-18 PROCEDURE — 99999PBSHW MENINGOCOCCAL B, OMV VACCINE: Mod: PBBFAC,,,

## 2024-03-18 NOTE — PROGRESS NOTES
Administered Men B . Pt tolerated well. No reactions noted after 15 minute wait. VIS for each vaccine provided to MOP. All questions answered.

## 2024-03-19 ENCOUNTER — PATIENT MESSAGE (OUTPATIENT)
Dept: FAMILY MEDICINE | Facility: CLINIC | Age: 24
End: 2024-03-19
Payer: OTHER GOVERNMENT

## 2024-03-19 DIAGNOSIS — Z23 NEED FOR VACCINATION: Primary | ICD-10-CM

## 2024-03-25 NOTE — TELEPHONE ENCOUNTER
Hep B vaccine ordered. I believe we have engerix in the office which would be 20mcg IM at 0, 1 and 6 months.     Will also need to do urinalysis at her vaccine visit.     Please set her up for nurse visit.

## 2024-03-28 ENCOUNTER — LAB VISIT (OUTPATIENT)
Dept: LAB | Facility: HOSPITAL | Age: 24
End: 2024-03-28
Attending: FAMILY MEDICINE
Payer: OTHER GOVERNMENT

## 2024-03-28 ENCOUNTER — TELEPHONE (OUTPATIENT)
Dept: FAMILY MEDICINE | Facility: CLINIC | Age: 24
End: 2024-03-28

## 2024-03-28 ENCOUNTER — CLINICAL SUPPORT (OUTPATIENT)
Dept: FAMILY MEDICINE | Facility: CLINIC | Age: 24
End: 2024-03-28
Payer: OTHER GOVERNMENT

## 2024-03-28 DIAGNOSIS — Z23 NEED FOR VACCINATION: Primary | ICD-10-CM

## 2024-03-28 DIAGNOSIS — Z78.9 NORMAL URINALYSIS: ICD-10-CM

## 2024-03-28 DIAGNOSIS — Z11.59 NEED FOR HEPATITIS C SCREENING TEST: ICD-10-CM

## 2024-03-28 LAB
BILIRUBIN, UA POC OHS: NEGATIVE
BLOOD, UA POC OHS: NEGATIVE
CLARITY, UA POC OHS: ABNORMAL
COLOR, UA POC OHS: YELLOW
GLUCOSE, UA POC OHS: NEGATIVE
KETONES, UA POC OHS: NEGATIVE
LEUKOCYTES, UA POC OHS: ABNORMAL
NITRITE, UA POC OHS: NEGATIVE
PH, UA POC OHS: 6.5
PROTEIN, UA POC OHS: 30
SPECIFIC GRAVITY, UA POC OHS: >=1.03
UROBILINOGEN, UA POC OHS: 0.2

## 2024-03-28 PROCEDURE — 99999PBSHW HEPATITIS B VACCINE ADULT IM: Mod: PBBFAC,,,

## 2024-03-28 PROCEDURE — 90471 IMMUNIZATION ADMIN: CPT | Mod: PBBFAC,PN

## 2024-03-28 PROCEDURE — 36415 COLL VENOUS BLD VENIPUNCTURE: CPT | Performed by: FAMILY MEDICINE

## 2024-03-28 PROCEDURE — 90746 HEPB VACCINE 3 DOSE ADULT IM: CPT | Mod: PBBFAC,PN

## 2024-03-28 PROCEDURE — 99999PBSHW POCT URINALYSIS(INSTRUMENT): Mod: PBBFAC,,,

## 2024-03-28 PROCEDURE — 81003 URINALYSIS AUTO W/O SCOPE: CPT | Mod: PBBFAC,PN

## 2024-03-28 PROCEDURE — 86803 HEPATITIS C AB TEST: CPT | Performed by: FAMILY MEDICINE

## 2024-03-28 NOTE — PROGRESS NOTES
Annmarie Aditi arrive to clinic awake and alert.  Pt verified name and .   Allergies reviewed with patient.  Pt given Hep B via Intramuscular Left deltoid per provider orders.    Well tolerated by patient.  denies further needs.    Patient instructed to wait 15 mins after injection.   Patient states no vaccines in the last 14 days.  Vaccine information sheet was given to patient. Patient voiced understanding.    Darlene Blunt LPN

## 2024-03-29 LAB — HCV AB SERPL QL IA: NORMAL

## 2024-04-11 ENCOUNTER — OFFICE VISIT (OUTPATIENT)
Dept: GASTROENTEROLOGY | Facility: CLINIC | Age: 24
End: 2024-04-11
Payer: OTHER GOVERNMENT

## 2024-04-11 ENCOUNTER — TELEPHONE (OUTPATIENT)
Dept: FAMILY MEDICINE | Facility: CLINIC | Age: 24
End: 2024-04-11
Payer: OTHER GOVERNMENT

## 2024-04-11 VITALS — WEIGHT: 166.88 LBS | HEIGHT: 69 IN | BODY MASS INDEX: 24.72 KG/M2

## 2024-04-11 DIAGNOSIS — Z86.2 HISTORY OF IRON DEFICIENCY ANEMIA: ICD-10-CM

## 2024-04-11 DIAGNOSIS — R14.0 ABDOMINAL BLOATING: ICD-10-CM

## 2024-04-11 DIAGNOSIS — R19.8 IRREGULAR BOWEL HABITS: ICD-10-CM

## 2024-04-11 DIAGNOSIS — K59.00 CONSTIPATION, UNSPECIFIED CONSTIPATION TYPE: Primary | ICD-10-CM

## 2024-04-11 PROCEDURE — 99213 OFFICE O/P EST LOW 20 MIN: CPT | Mod: PBBFAC,PO

## 2024-04-11 PROCEDURE — 99204 OFFICE O/P NEW MOD 45 MIN: CPT | Mod: S$PBB,,,

## 2024-04-11 PROCEDURE — 99999 PR PBB SHADOW E&M-EST. PATIENT-LVL III: CPT | Mod: PBBFAC,,,

## 2024-04-11 RX ORDER — CLINDAMYCIN PHOSPHATE 10 MG/G
1 GEL TOPICAL 2 TIMES DAILY
COMMUNITY
Start: 2024-04-08 | End: 2024-05-17

## 2024-04-11 NOTE — PROGRESS NOTES
Subjective:       Patient ID: Annmarie Pennington is a 24 y.o. female Body mass index is 24.65 kg/m².    Chief Complaint: Constipation    This patient is new to me.     Constipation  This is a chronic problem. The current episode started more than 1 year ago (Started in high school). The problem is unchanged. Stool frequency: Typically has 1 BM every 2 days, but sometimes goes 5 days without BMs; straining intermittently and does not feel complete after BMs. The stool is described as formed, firm and pellet like (Rated stool 1 on Shirland scale typically and 4 after using laxative). The patient is on a high fiber diet (Eats fiber bar daily). She Exercises regularly (Exercises 20 minutes daily). There has Not been adequate water intake (Drinks three 8 oz glasses of water daily). Associated symptoms include bloating (Improves after BMs). Pertinent negatives include no abdominal pain, back pain, diarrhea, difficulty urinating, fecal incontinence, fever, flatus, hematochezia, hemorrhoids, melena, nausea, rectal pain, vomiting or weight loss. Risk factors include travel (Worsens with travel; recently traveled to [place] 03/2023). She has tried laxatives, fiber and diet changes (Currently taking sennosides laxative p.r.n. once monthly; past treatments: MiraLax (ineffective)) for the symptoms. Improvement on treatment: Short term relief. There is no history of abdominal surgery, endocrine disease, inflammatory bowel disease, irritable bowel syndrome, metabolic disease, neurologic disease, neuromuscular disease, psychiatric history or radiation treatment.     Review of Systems   Constitutional:  Negative for activity change, appetite change, chills, diaphoresis, fatigue, fever, unexpected weight change and weight loss.   HENT:  Negative for sore throat and trouble swallowing.    Respiratory:  Negative for cough, choking and shortness of breath.    Cardiovascular:  Negative for chest pain.   Gastrointestinal:  Positive  for bloating (Improves after BMs) and constipation. Negative for abdominal distention, abdominal pain, anal bleeding, blood in stool, diarrhea, flatus, hematochezia, hemorrhoids, melena, nausea, rectal pain and vomiting.   Genitourinary:  Negative for difficulty urinating.   Musculoskeletal:  Negative for back pain.       No LMP recorded. (Menstrual status: Birth Control).  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 No pertinent surgical history.  Family History   Problem Relation Age of Onset    Cancer Mother     Rosacea Mother     Hypertension Father     Hyperlipidemia Father     Breast cancer Maternal Grandmother     Diabetes Paternal Grandmother     Diabetes Paternal Grandfather     Colon cancer Neg Hx     Ovarian cancer Neg Hx     Esophageal cancer Neg Hx     Stomach cancer Neg Hx     Ulcerative colitis Neg Hx     Crohn's disease Neg Hx      Social History     Tobacco Use    Smoking status: Former     Types: Vaping with nicotine     Passive exposure: Never    Smokeless tobacco: Never   Substance Use Topics    Alcohol use: Yes     Comment: Socially; 4x month    Drug use: No     Wt Readings from Last 10 Encounters:   04/11/24 75.7 kg (166 lb 14.2 oz)   03/15/24 75.3 kg (166 lb)   03/07/24 74.4 kg (164 lb)   12/28/23 76.2 kg (168 lb)   12/21/23 76.5 kg (168 lb 10.4 oz)   12/18/23 75 kg (165 lb 5.5 oz)   12/05/23 72.7 kg (160 lb 4.4 oz)   11/12/23 72.1 kg (159 lb)   11/10/23 72.3 kg (159 lb 6.3 oz)   10/24/23 71.2 kg (157 lb)     Lab Results   Component Value Date    WBC 4.10 08/10/2023    HGB 12.4 08/10/2023    HCT 39.5 08/10/2023    MCV 83 08/10/2023     08/10/2023     CMP  Sodium   Date Value Ref Range Status   12/18/2023 142 136 - 145 mmol/L Final     Potassium    Date Value Ref Range Status   12/18/2023 4.5 3.5 - 5.1 mmol/L Final     Chloride   Date Value Ref Range Status   12/18/2023 109 95 - 110 mmol/L Final     CO2   Date Value Ref Range Status   12/18/2023 24 23 - 29 mmol/L Final     Glucose   Date Value Ref Range Status   12/18/2023 71 70 - 110 mg/dL Final     BUN   Date Value Ref Range Status   12/18/2023 9 6 - 20 mg/dL Final     Creatinine   Date Value Ref Range Status   12/18/2023 0.8 0.5 - 1.4 mg/dL Final     Calcium   Date Value Ref Range Status   12/18/2023 9.6 8.7 - 10.5 mg/dL Final     Total Protein   Date Value Ref Range Status   12/18/2023 6.8 6.0 - 8.4 g/dL Final     Albumin   Date Value Ref Range Status   12/18/2023 3.4 (L) 3.5 - 5.2 g/dL Final     Total Bilirubin   Date Value Ref Range Status   12/18/2023 0.4 0.1 - 1.0 mg/dL Final     Comment:     For infants and newborns, interpretation of results should be based  on gestational age, weight and in agreement with clinical  observations.    Premature Infant recommended reference ranges:  Up to 24 hours.............<8.0 mg/dL  Up to 48 hours............<12.0 mg/dL  3-5 days..................<15.0 mg/dL  6-29 days.................<15.0 mg/dL       Alkaline Phosphatase   Date Value Ref Range Status   12/18/2023 38 (L) 55 - 135 U/L Final     AST   Date Value Ref Range Status   12/18/2023 21 10 - 40 U/L Final     ALT   Date Value Ref Range Status   12/18/2023 8 (L) 10 - 44 U/L Final     Anion Gap   Date Value Ref Range Status   12/18/2023 9 8 - 16 mmol/L Final     eGFR if    Date Value Ref Range Status   02/28/2019 SEE COMMENT >60 mL/min/1.73 m^2 Final     eGFR if non    Date Value Ref Range Status   02/28/2019 SEE COMMENT >60 mL/min/1.73 m^2 Final     Comment:     Calculation used to obtain the estimated glomerular filtration  rate (eGFR) is the CKD-EPI equation.   Test not performed.  GFR calculation is only valid for patients   18 and older.       Lab Results   Component Value  Date    TSH 0.959 12/18/2023     Lab Results   Component Value Date    IRON 135 12/18/2023    TRANSFERRIN 345 12/18/2023    TIBC 511 (H) 12/18/2023    FESATURATED 26 12/18/2023      Lab Results   Component Value Date    FERRITIN 7 (L) 12/18/2023     Reviewed prior medical records including radiology report of chest x-ray 05/02/2022, abdominal ultrasound 12/03/2018 & endoscopy history (see surgical history).    Objective:      Physical Exam  Vitals and nursing note reviewed.   Constitutional:       General: She is not in acute distress.     Appearance: Normal appearance. She is normal weight. She is not ill-appearing.   HENT:      Mouth/Throat:      Lips: Pink. No lesions.   Cardiovascular:      Rate and Rhythm: Normal rate.      Pulses: Normal pulses.      Heart sounds: Normal heart sounds.   Pulmonary:      Effort: Pulmonary effort is normal. No respiratory distress.      Breath sounds: Normal breath sounds.   Abdominal:      General: Bowel sounds are normal. There is no distension or abdominal bruit. There are no signs of injury.      Palpations: Abdomen is soft. There is no shifting dullness, fluid wave, hepatomegaly, splenomegaly or mass.      Tenderness: There is no abdominal tenderness. There is no guarding or rebound. Negative signs include Banks's sign, Rovsing's sign and McBurney's sign.   Skin:     General: Skin is warm and dry.      Coloration: Skin is not jaundiced or pale.   Neurological:      Mental Status: She is alert and oriented to person, place, and time.   Psychiatric:         Attention and Perception: Attention normal.         Mood and Affect: Mood normal.         Speech: Speech normal.         Behavior: Behavior normal.         Assessment:       1. Constipation, unspecified constipation type    2. Irregular bowel habits    3. Abdominal bloating    4. History of iron deficiency anemia        Plan:       Constipation, unspecified constipation type & Irregular bowel habits  - schedule  Colonoscopy, discussed procedure with the patient, including risks and benefits, patient verbalized understanding  -Recommend daily exercise as tolerated, adequate water intake (six 8-oz glasses of water daily), and high fiber diet. OTC fiber supplements are recommended if diet does not reach daily fiber goal (20-30 grams daily), such as Metamucil, Citrucel, or FiberCon (take as directed, separate from other oral medications by >2 hours).  -     CBC Without Differential; Future; Expected date: 04/11/2024  -     Comprehensive Metabolic Panel; Future; Expected date: 04/11/2024  -     TSH; Future; Expected date: 04/11/2024  - START: linaCLOtide (LINZESS) 72 mcg Cap capsule; Take 1 capsule (72 mcg total) by mouth before breakfast.  Dispense: 90 capsule; Refill: 0    Abdominal bloating  - schedule Colonoscopy, discussed procedure with the patient, including risks and benefits, patient verbalized understanding  - recommend OTC simethicone as directed, such as Phazyme or Gas-x  - recommend low gas diet: Reduce or eliminate these foods from your diet: Broccoli, Cauliflower, [place] sprouts, Cabbage, Cooked dried beans, Carbonated beverages (sparkling water, soda, beer, champagne)  Other Causes Of Excess Gas Include:   1) EATING TOO FAST or TALKING WHILE YOU CHEW may cause you to swallow air. This increases the amount of gas in the stomach and may worsen your symptoms.  --> Chew each mouthful completely before swallowing. Take your time.  2) OVEREATING may increase the feeling of being bloated and cause more gas.  --> When you are full, stop eating.  3) CONSTIPATION can increase the amount of normal intestinal gas.  --> Avoid constipation by increasing the amount of fiber in your diet by including whole cereal grains, fresh vegetables (except those in the above list) and fresh fruits. High-fiber foods absorb water and carry it out of the body. When increasing the amount of fiber in your diet, you also need to increase the  amount of water that you drink. You should drink at least eight 8-ounce glasses of water (two quarts) per day.    History of iron deficiency anemia  -follow-up with PCP and/or hematology for continued evaluation and management; hx of menorrhagia    Follow up in about 4 weeks (around 5/9/2024), or if symptoms worsen or fail to improve.      If no improvement in symptoms or symptoms worsen, call/follow-up at clinic or go to ER.        Total time spent on the encounter includes face to face time and non-face to face time preparing to see the patient (eg, review of tests), Obtaining and/or reviewing separately obtained history, Documenting clinical information in the electronic or other health record, Independently interpreting results (not separately reported) and communicating results to the patient/family/caregiver, or Care coordination (not separately reported).     A dictation software program was used for this note. Please expect some simple typographical  errors in this note.

## 2024-04-12 ENCOUNTER — PATIENT MESSAGE (OUTPATIENT)
Dept: FAMILY MEDICINE | Facility: CLINIC | Age: 24
End: 2024-04-12
Payer: OTHER GOVERNMENT

## 2024-04-16 NOTE — TELEPHONE ENCOUNTER
Form was completed and should be in folder up front- if she is unable to get from office due to school, please scan and email to me and I can upload into her chart for her.     In addition the vaccine scheduled should be delineated in my message from 3/19  Hep B vaccine ordered. I believe we have engerix in the office which would be 20mcg IM at 0, 1 and 6 months.

## 2024-04-17 ENCOUNTER — TELEPHONE (OUTPATIENT)
Dept: FAMILY MEDICINE | Facility: CLINIC | Age: 24
End: 2024-04-17
Payer: OTHER GOVERNMENT

## 2024-04-17 DIAGNOSIS — Z23 NEED FOR VACCINATION: Primary | ICD-10-CM

## 2024-04-17 NOTE — TELEPHONE ENCOUNTER
She will repeat her hep B titer AFTER completing her full series if required by her school. The form was completed. And we should have scheduled her second dose at the time of her first injection. Please do this.

## 2024-04-29 ENCOUNTER — TELEPHONE (OUTPATIENT)
Dept: FAMILY MEDICINE | Facility: CLINIC | Age: 24
End: 2024-04-29

## 2024-04-29 ENCOUNTER — CLINICAL SUPPORT (OUTPATIENT)
Dept: FAMILY MEDICINE | Facility: CLINIC | Age: 24
End: 2024-04-29
Payer: OTHER GOVERNMENT

## 2024-04-29 DIAGNOSIS — Z01.84 IMMUNITY STATUS TESTING: Primary | ICD-10-CM

## 2024-04-29 DIAGNOSIS — Z23 NEED FOR VACCINATION: Primary | ICD-10-CM

## 2024-04-29 PROCEDURE — 99211 OFF/OP EST MAY X REQ PHY/QHP: CPT | Mod: PBBFAC,PN,25

## 2024-04-29 PROCEDURE — 99999 PR PBB SHADOW E&M-EST. PATIENT-LVL I: CPT | Mod: PBBFAC,,,

## 2024-04-29 PROCEDURE — 90471 IMMUNIZATION ADMIN: CPT | Mod: PBBFAC,PN

## 2024-04-29 PROCEDURE — 99999PBSHW HEPATITIS B VACCINE ADULT IM: Mod: PBBFAC,,,

## 2024-04-29 PROCEDURE — 90746 HEPB VACCINE 3 DOSE ADULT IM: CPT | Mod: PBBFAC,PN

## 2024-04-29 NOTE — PROGRESS NOTES
After obtaining consent, and per orders of Dr. Maxwell, injection of Engerix-B 20mcg IM on left deltoid given by Mary Bailey. Patient instructed to remain in clinic for 20 minutes afterwards, and to report any adverse reaction to me immediately.

## 2024-05-21 ENCOUNTER — PATIENT MESSAGE (OUTPATIENT)
Dept: FAMILY MEDICINE | Facility: CLINIC | Age: 24
End: 2024-05-21
Payer: OTHER GOVERNMENT

## 2024-05-22 ENCOUNTER — ANESTHESIA (OUTPATIENT)
Dept: ENDOSCOPY | Facility: HOSPITAL | Age: 24
End: 2024-05-22
Payer: OTHER GOVERNMENT

## 2024-05-22 ENCOUNTER — HOSPITAL ENCOUNTER (OUTPATIENT)
Facility: HOSPITAL | Age: 24
Discharge: HOME | End: 2024-05-22
Attending: INTERNAL MEDICINE | Admitting: INTERNAL MEDICINE
Payer: OTHER GOVERNMENT

## 2024-05-22 ENCOUNTER — ANESTHESIA EVENT (OUTPATIENT)
Dept: ENDOSCOPY | Facility: HOSPITAL | Age: 24
End: 2024-05-22
Payer: OTHER GOVERNMENT

## 2024-05-22 VITALS
TEMPERATURE: 98 F | HEART RATE: 62 BPM | WEIGHT: 165 LBS | SYSTOLIC BLOOD PRESSURE: 110 MMHG | DIASTOLIC BLOOD PRESSURE: 59 MMHG | HEIGHT: 69 IN | RESPIRATION RATE: 15 BRPM | OXYGEN SATURATION: 98 % | BODY MASS INDEX: 24.44 KG/M2

## 2024-05-22 DIAGNOSIS — K59.00 CONSTIPATION: ICD-10-CM

## 2024-05-22 LAB
B-HCG UR QL: NEGATIVE
CTP QC/QA: YES

## 2024-05-22 PROCEDURE — 37000008 HC ANESTHESIA 1ST 15 MINUTES: Mod: PO | Performed by: INTERNAL MEDICINE

## 2024-05-22 PROCEDURE — D9220A PRA ANESTHESIA: Mod: ANES,,, | Performed by: ANESTHESIOLOGY

## 2024-05-22 PROCEDURE — 37000009 HC ANESTHESIA EA ADD 15 MINS: Mod: PO | Performed by: INTERNAL MEDICINE

## 2024-05-22 PROCEDURE — 63600175 PHARM REV CODE 636 W HCPCS: Mod: PO | Performed by: INTERNAL MEDICINE

## 2024-05-22 PROCEDURE — 25000003 PHARM REV CODE 250: Mod: PO | Performed by: NURSE ANESTHETIST, CERTIFIED REGISTERED

## 2024-05-22 PROCEDURE — 63600175 PHARM REV CODE 636 W HCPCS: Mod: PO | Performed by: NURSE ANESTHETIST, CERTIFIED REGISTERED

## 2024-05-22 PROCEDURE — 45378 DIAGNOSTIC COLONOSCOPY: CPT | Mod: PO | Performed by: INTERNAL MEDICINE

## 2024-05-22 PROCEDURE — D9220A PRA ANESTHESIA: Mod: CRNA,,, | Performed by: NURSE ANESTHETIST, CERTIFIED REGISTERED

## 2024-05-22 PROCEDURE — 45378 DIAGNOSTIC COLONOSCOPY: CPT | Mod: ,,, | Performed by: INTERNAL MEDICINE

## 2024-05-22 PROCEDURE — 81025 URINE PREGNANCY TEST: CPT | Mod: PO | Performed by: INTERNAL MEDICINE

## 2024-05-22 RX ORDER — SODIUM CHLORIDE, SODIUM LACTATE, POTASSIUM CHLORIDE, CALCIUM CHLORIDE 600; 310; 30; 20 MG/100ML; MG/100ML; MG/100ML; MG/100ML
INJECTION, SOLUTION INTRAVENOUS CONTINUOUS
Status: DISCONTINUED | OUTPATIENT
Start: 2024-05-22 | End: 2024-05-22 | Stop reason: HOSPADM

## 2024-05-22 RX ORDER — SODIUM CHLORIDE 0.9 % (FLUSH) 0.9 %
10 SYRINGE (ML) INJECTION
Status: DISCONTINUED | OUTPATIENT
Start: 2024-05-22 | End: 2024-05-22 | Stop reason: HOSPADM

## 2024-05-22 RX ORDER — PROPOFOL 10 MG/ML
VIAL (ML) INTRAVENOUS
Status: DISCONTINUED | OUTPATIENT
Start: 2024-05-22 | End: 2024-05-22

## 2024-05-22 RX ORDER — LIDOCAINE HYDROCHLORIDE 20 MG/ML
INJECTION INTRAVENOUS
Status: DISCONTINUED | OUTPATIENT
Start: 2024-05-22 | End: 2024-05-22

## 2024-05-22 RX ADMIN — PROPOFOL 50 MG: 10 INJECTION, EMULSION INTRAVENOUS at 11:05

## 2024-05-22 RX ADMIN — SODIUM CHLORIDE, POTASSIUM CHLORIDE, SODIUM LACTATE AND CALCIUM CHLORIDE: 600; 310; 30; 20 INJECTION, SOLUTION INTRAVENOUS at 09:05

## 2024-05-22 RX ADMIN — LIDOCAINE HYDROCHLORIDE 100 MG: 20 INJECTION INTRAVENOUS at 10:05

## 2024-05-22 RX ADMIN — PROPOFOL 50 MG: 10 INJECTION, EMULSION INTRAVENOUS at 10:05

## 2024-05-22 RX ADMIN — PROPOFOL 75 MG: 10 INJECTION, EMULSION INTRAVENOUS at 10:05

## 2024-05-22 RX ADMIN — PROPOFOL 150 MG: 10 INJECTION, EMULSION INTRAVENOUS at 10:05

## 2024-05-22 NOTE — ANESTHESIA PREPROCEDURE EVALUATION
05/22/2024  Annmarie Pennington is a 24 y.o., female.      Pre-op Assessment          Review of Systems  Neurological:      Headaches      Dx of Headaches                               Physical Exam  General: Well nourished        Anesthesia Plan  Type of Anesthesia, risks & benefits discussed:    Anesthesia Type: Gen Natural Airway  Intra-op Monitoring Plan: Standard ASA Monitors  Induction:  IV  Informed Consent: Informed consent signed with the Patient and all parties understand the risks and agree with anesthesia plan.  All questions answered.   ASA Score: 2  Day of Surgery Review of History & Physical: H&P Update referred to the surgeon/provider.    Ready For Surgery From Anesthesia Perspective.     .

## 2024-05-22 NOTE — PROVATION PATIENT INSTRUCTIONS
Discharge Summary/Instructions after an Endoscopic Procedure  Patient Name: Annmarie Choi  Patient MRN: [id number]  Patient   YOB: 2000  Wednesday, May 22, 2024  Madhu Shepherd MD  Dear patient,  As a result of recent federal legislation (The Federal Cures Act), you may   receive lab or pathology results from your procedure in your [facility]   account before your physician is able to contact you. Your physician or   their representative will relay the results to you with their   recommendations at their soonest availability.  Thank you,  RESTRICTIONS:  During your procedure today, you received medications for sedation.  These   medications may affect your judgment, balance and coordination.  Therefore,   for 24 hours, you have the following restrictions:   - DO NOT drive a car, operate machinery, make legal/financial decisions,   sign important papers or drink alcohol.    ACTIVITY:  Today: no heavy lifting, straining or running due to procedural   sedation/anesthesia.  The following day: return to full activity including work.  DIET:  Eat and drink normally unless instructed otherwise.     TREATMENT FOR COMMON SIDE EFFECTS:  - Mild abdominal pain, nausea, belching, bloating or excessive gas:  rest,   eat lightly and use a heating pad.  - Sore Throat: treat with throat lozenges and/or gargle with warm salt   water.  - Because air was used during the procedure, expelling large amounts of air   from your rectum or belching is normal.  - If a bowel prep was taken, you may not have a bowel movement for 1-3 days.    This is normal.  SYMPTOMS TO WATCH FOR AND REPORT TO YOUR PHYSICIAN:  1. Abdominal pain or bloating, other than gas cramps.  2. Chest pain.  3. Back pain.  4. Signs of infection such as: chills or fever occurring within 24 hours   after the procedure.  5. Rectal bleeding, which would show as bright red, maroon, or black stools.   (A tablespoon of blood from the rectum is not serious,  especially if   hemorrhoids are present.)  6. Vomiting.  7. Weakness or dizziness.  GO DIRECTLY TO THE NEAREST EMERGENCY ROOM IF YOU HAVE ANY OF THE FOLLOWING:      Difficulty breathing              Chills and/or fever over 101 F   Persistent vomiting and/or vomiting blood   Severe abdominal pain   Severe chest pain   Black, tarry stools   Bleeding- more than one tablespoon   Any other symptom or condition that you feel may need urgent attention  Your doctor recommends these additional instructions:  If any biopsies were taken, your doctors clinic will contact you in 1 to 2   weeks with any results.  Your physician has recommended a repeat colonoscopy at age 47 (please   contact the clinic prior to your 47th birthday to schedule) for screening   purposes.   You are being discharged to home.  For questions, problems or results please call your physician - Madhu Shepherd MD at Work:  [phone].  EMERGENCY PHONE NUMBER: [phone], LAB RESULTS: [phone]  IF A COMPLICATION OR EMERGENCY SITUATION ARISES AND YOU ARE UNABLE TO REACH   YOUR PHYSICIAN - GO DIRECTLY TO THE EMERGENCY ROOM.  ___________________________________________  Nurse Signature  ___________________________________________  Patient/Designated Responsible Party Signature  Madhu Shepherd MD  5/22/2024 11:15:37 AM  This report has been verified and signed electronically.  Dear patient,  As a result of recent federal legislation (The Federal Cures Act), you may   receive lab or pathology results from your procedure in your [facility]   account before your physician is able to contact you. Your physician or   their representative will relay the results to you with their   recommendations at their soonest availability.  Thank you.  PROVATION

## 2024-05-22 NOTE — TRANSFER OF CARE
"Anesthesia Transfer of Care Note    Patient: Annmarie Pennington    Procedure(s) Performed: Procedure(s) (LRB):  COLONOSCOPY (N/A)    Patient location: PACU    Anesthesia Type: general    Transport from OR: Transported from OR on room air with adequate spontaneous ventilation    Post pain: adequate analgesia    Post assessment: no apparent anesthetic complications and tolerated procedure well    Post vital signs: stable    Level of consciousness: sedated and awake    Nausea/Vomiting: no nausea/vomiting    Complications: none    Transfer of care protocol was followed      Last vitals: Visit Vitals  BP (!) 112/59   Pulse 73   Temp 36.4 °C (97.6 °F)   Resp 16   Ht 5' 9" (1.753 m)   Wt 74.8 kg (165 lb)   LMP 05/12/2024 (Exact Date)   SpO2 99%   Breastfeeding No   BMI 24.37 kg/m²     "

## 2024-05-22 NOTE — DISCHARGE SUMMARY
[facility] - Endoscopy  Discharge Note  Short Stay  Discharge Note  Short Stay      SUMMARY     Admit Date: 5/22/2024    Attending Physician: Madhu Shepherd MD     Discharge Physician: Madhu Shepherd MD    Discharge Date: 5/22/2024 11:16 AM    Final Diagnosis: Constipation, unspecified constipation type [K59.00]  Abdominal bloating [R14.0]    Disposition: HOME OR SELF CARE    Patient Instructions:   Current Discharge Medication List        CONTINUE these medications which have NOT CHANGED    Details   linaCLOtide (LINZESS) 72 mcg Cap capsule Take 1 capsule (72 mcg total) by mouth before breakfast.  Qty: 90 capsule, Refills: 0    Associated Diagnoses: Constipation, unspecified constipation type      vilazodone (VIIBRYD) 10 mg Tab tablet Take 1 tablet (10 mg total) by mouth once daily.  Qty: 90 tablet, Refills: 3    Associated Diagnoses: Anxiety      drospirenone-ethinyl estradioL (RAFFI) 3-0.03 mg per tablet Take 1 tablet by mouth once daily.  Qty: 84 tablet, Refills: 3    Associated Diagnoses: Family planning             Discharge Procedure Orders (must include Diet, Follow-up, Activity)    Follow Up:  Follow up with PCP as previously scheduled  Resume routine diet.  Activity as tolerated.    No driving day of procedure.

## 2024-05-22 NOTE — ANESTHESIA POSTPROCEDURE EVALUATION
Anesthesia Post Evaluation    Patient: Annmarie Pennington    Procedure(s) Performed: Procedure(s) (LRB):  COLONOSCOPY (N/A)    Final Anesthesia Type: general      Patient location during evaluation: PACU  Patient participation: Yes- Able to Participate  Level of consciousness: awake and alert  Post-procedure vital signs: reviewed and stable  Pain management: adequate  Airway patency: patent    PONV status at discharge: No PONV  Anesthetic complications: no      Cardiovascular status: blood pressure returned to baseline  Respiratory status: unassisted and room air  Hydration status: euvolemic  Follow-up not needed.              Vitals Value Taken Time   /59 05/22/24 1142   Temp 36.6 °C (97.8 °F) 05/22/24 1115   Pulse 62 05/22/24 1142   Resp 15 05/22/24 1142   SpO2 98 % 05/22/24 1142         Event Time   Out of Recovery 11:47:00         Pain/Anny Score: Anny Score: 10 (5/22/2024 11:39 AM)

## 2024-05-22 NOTE — H&P
History & Physical - Short Stay  Gastroenterology      SUBJECTIVE:     Procedure: Colonoscopy    Chief Complaint/Indication for Procedure: Change in Bowel Habits    PTA Medications   Medication Sig    linaCLOtide (LINZESS) 72 mcg Cap capsule Take 1 capsule (72 mcg total) by mouth before breakfast.    vilazodone (VIIBRYD) 10 mg Tab tablet Take 1 tablet (10 mg total) by mouth once daily. (Patient taking differently: Take 10 mg by mouth daily as needed. To manage acute symptoms of anxiety and depression)    drospirenone-ethinyl estradioL (RAFFI) 3-0.03 mg per tablet Take 1 tablet by mouth once daily.       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 No pertinent surgical history.  Family History   Problem Relation Name Age of Onset    Cancer Mother      Rosacea Mother      Hypertension Father      Hyperlipidemia Father      Breast cancer Maternal Grandmother      Diabetes Paternal Grandmother      Diabetes Paternal Grandfather      Colon cancer Neg Hx      Ovarian cancer Neg Hx      Esophageal cancer Neg Hx      Stomach cancer Neg Hx      Ulcerative colitis Neg Hx      Crohn's disease Neg Hx       Social History     Tobacco Use    Smoking status: Former     Types: Vaping with nicotine     Passive exposure: Never    Smokeless tobacco: Never   Substance Use Topics    Alcohol use: Yes     Comment: Socially; 4x month    Drug use: No         OBJECTIVE:     Vital Signs (Most Recent)  Temp: 97.6 °F (36.4 °C) (05/22/24 0934)  Pulse: 73 (05/22/24 0934)  Resp: 16 (05/22/24 0934)  BP: (!) 112/59 (05/22/24 0934)  SpO2: 99 % (05/22/24 0934)    Physical Exam:                                                       GENERAL:   Comfortable, in no acute distress.                                 HEENT EXAM:  Nonicteric.  No adenopathy.  Oropharynx is clear.               NECK:  Supple.                                                               LUNGS:  Clear.                                                               CARDIAC:  Regular rate and rhythm.  S1, S2.  No murmur.                      ABDOMEN:  Soft, positive bowel sounds, nontender.  No hepatosplenomegaly or masses.  No rebound or guarding.                                             EXTREMITIES:  No edema.     MENTAL STATUS:  Normal, alert and oriented.      ASSESSMENT/PLAN:     Assessment: Change in Bowel Habits    Plan: Colonoscopy    Anesthesia Plan: General    ASA Grade: ASA 2 - Patient with mild systemic disease with no functional limitations    MALLAMPATI SCORE:  I (soft palate, uvula, fauces, and tonsillar pillars visible)

## 2024-06-05 ENCOUNTER — PATIENT MESSAGE (OUTPATIENT)
Dept: FAMILY MEDICINE | Facility: CLINIC | Age: 24
End: 2024-06-05
Payer: OTHER GOVERNMENT

## 2024-06-06 ENCOUNTER — OFFICE VISIT (OUTPATIENT)
Dept: URGENT CARE | Facility: CLINIC | Age: 24
End: 2024-06-06
Payer: OTHER GOVERNMENT

## 2024-06-06 VITALS
DIASTOLIC BLOOD PRESSURE: 76 MMHG | HEIGHT: 69 IN | SYSTOLIC BLOOD PRESSURE: 103 MMHG | TEMPERATURE: 98 F | WEIGHT: 167.75 LBS | BODY MASS INDEX: 24.85 KG/M2 | OXYGEN SATURATION: 98 % | HEART RATE: 73 BPM | RESPIRATION RATE: 18 BRPM

## 2024-06-06 DIAGNOSIS — M54.50 ACUTE LEFT-SIDED LOW BACK PAIN WITHOUT SCIATICA: Primary | ICD-10-CM

## 2024-06-06 DIAGNOSIS — R10.9 FLANK PAIN: ICD-10-CM

## 2024-06-06 LAB
B-HCG UR QL: NEGATIVE
BILIRUB UR QL STRIP: NEGATIVE
CTP QC/QA: YES
GLUCOSE UR QL STRIP: NEGATIVE
KETONES UR QL STRIP: NEGATIVE
LEUKOCYTE ESTERASE UR QL STRIP: NEGATIVE
PH, POC UA: 6
POC BLOOD, URINE: NEGATIVE
POC NITRATES, URINE: NEGATIVE
PROT UR QL STRIP: NEGATIVE
SP GR UR STRIP: 1.02 (ref 1–1.03)
UROBILINOGEN UR STRIP-ACNC: NORMAL (ref 0.1–1.1)

## 2024-06-06 PROCEDURE — 81003 URINALYSIS AUTO W/O SCOPE: CPT | Mod: QW,S$GLB,,

## 2024-06-06 PROCEDURE — 81025 URINE PREGNANCY TEST: CPT | Mod: S$GLB,,,

## 2024-06-06 PROCEDURE — 99213 OFFICE O/P EST LOW 20 MIN: CPT | Mod: S$GLB,,,

## 2024-06-06 RX ORDER — KETOROLAC TROMETHAMINE 10 MG/1
10 TABLET, FILM COATED ORAL EVERY 6 HOURS PRN
Qty: 12 TABLET | Refills: 0 | Status: SHIPPED | OUTPATIENT
Start: 2024-06-06 | End: 2024-06-09

## 2024-06-06 RX ORDER — METHYLPREDNISOLONE 4 MG/1
TABLET ORAL
Qty: 21 EACH | Refills: 0 | Status: SHIPPED | OUTPATIENT
Start: 2024-06-06 | End: 2024-06-12 | Stop reason: ALTCHOICE

## 2024-06-06 RX ORDER — KETOROLAC TROMETHAMINE 10 MG/1
10 TABLET, FILM COATED ORAL EVERY 6 HOURS PRN
Qty: 12 TABLET | Refills: 0 | Status: SHIPPED | OUTPATIENT
Start: 2024-06-06 | End: 2024-06-06

## 2024-06-06 RX ORDER — METHYLPREDNISOLONE 4 MG/1
TABLET ORAL
Qty: 21 EACH | Refills: 0 | Status: SHIPPED | OUTPATIENT
Start: 2024-06-06 | End: 2024-06-06

## 2024-06-06 RX ORDER — FERROUS SULFATE 325(65) MG
TABLET, DELAYED RELEASE (ENTERIC COATED) ORAL
COMMUNITY
Start: 2024-06-03

## 2024-06-06 NOTE — PROGRESS NOTES
"Subjective:       Patient ID: Annmarie Pennington is a 24 y.o. female.    Vitals:  height is 5' 9" (1.753 m) and weight is 76.1 kg (167 lb 12.3 oz). Her oral temperature is 98.1 °F (36.7 °C). Her blood pressure is 103/76 and her pulse is 73. Her respiration is 18 and oxygen saturation is 98%.     Chief Complaint: Back Pain    This is a 24 y.o. female who presents today with a chief complaint of left lower back pain x 2 days.  Denies any trauma.  States it does not hurt as long as she is either standing or sitting straight.  It does hurt to lay down, twist or bend down.States she has an old prescription of flexeril that she has taken with no relief. Patient states that the pain is on the left lower back area. Patient states that the pain does not radiate. Patient states that the pain is a pulling pain. Patient states that the pain gets worse with certain positions. Patient denies any other issues at this time.   Patient presents with:  Back Pain         Back Pain  This is a new problem. The current episode started in the past 7 days. The problem has been gradually worsening since onset. The pain is at a severity of 0/10 (7/10 if she moves certain ways). The patient is experiencing no pain. The symptoms are aggravated by bending, lying down, position and twisting.       Constitution: Negative.   HENT: Negative.     Neck: neck negative.   Cardiovascular: Negative.    Eyes: Negative.    Respiratory: Negative.     Gastrointestinal: Negative.    Endocrine: negative.   Genitourinary: Negative.    Musculoskeletal:  Positive for back pain.   Skin: Negative.    Allergic/Immunologic: Negative.    Hematologic/Lymphatic: Negative.    Psychiatric/Behavioral: Negative.             Objective:      Physical Exam   Constitutional: She is oriented to person, place, and time.   HENT:   Head: Normocephalic and atraumatic.   Nose: Nose normal.   Eyes: Conjunctivae are normal. Pupils are equal, round, and reactive to light.   Neck: " Neck supple.   Cardiovascular: Normal rate and normal pulses.   Pulmonary/Chest: Effort normal.   Abdominal: Normal appearance.   Musculoskeletal: Normal range of motion.         General: Tenderness and signs of injury present. No swelling. Normal range of motion.      Thoracic back: Normal.      Lumbar back: She exhibits tenderness.      Comments: Left lower back pain worse with certain movement.    Neurological: no focal deficit. She is alert, oriented to person, place, and time and at baseline.   Skin: Skin is warm.   Psychiatric: Her behavior is normal. Mood, judgment and thought content normal.   Nursing note and vitals reviewed.        Past medical history and current medications reviewed.       Assessment:           1. Acute left-sided low back pain without sciatica    2. Flank pain        Results for orders placed or performed in visit on 06/06/24   POCT Urinalysis, Dipstick, Automated, W/O Scope   Result Value Ref Range    POC Blood, Urine Negative Negative    POC Bilirubin, Urine Negative Negative    POC Urobilinogen, Urine Normal 0.1 - 1.1    POC Ketones, Urine Negative Negative    POC Protein, Urine Negative Negative    POC Nitrates, Urine Negative Negative    POC Glucose, Urine Negative Negative    pH, UA 6.0     POC Specific Gravity, Urine 1.025 1.003 - 1.029    POC Leukocytes, Urine Negative Negative   POCT urine pregnancy   Result Value Ref Range    POC Preg Test, Ur Negative Negative     Acceptable Yes           Plan:         Acute left-sided low back pain without sciatica  -     Discontinue: methylPREDNISolone (MEDROL DOSEPACK) 4 mg tablet; use as directed  Dispense: 21 each; Refill: 0  -     Discontinue: ketorolac (TORADOL) 10 mg tablet; Take 1 tablet (10 mg total) by mouth every 6 (six) hours as needed for Pain (pain).  Dispense: 12 tablet; Refill: 0  -     ketorolac (TORADOL) 10 mg tablet; Take 1 tablet (10 mg total) by mouth every 6 (six) hours as needed for Pain (pain).   Dispense: 12 tablet; Refill: 0  -     methylPREDNISolone (MEDROL DOSEPACK) 4 mg tablet; use as directed  Dispense: 21 each; Refill: 0    Flank pain  -     POCT Urinalysis, Dipstick, Automated, W/O Scope  -     POCT urine pregnancy             Patient Instructions   You must understand that you've received an Urgent Care treatment only and that you may be released before all your medical problems are known or treated. You, the patient, will arrange for follow up care as instructed.  Follow up with your PCP or specialty clinic as directed in the next 1-2 weeks if not improved or as needed.  You can call [phone] to schedule an appointment with the appropriate provider.  If your condition worsens we recommend that you receive another evaluation at the emergency room immediately or contact your primary medical clinics after hours call service to discuss your concerns.  Please return here or go to the Emergency Department for any concerns or worsening of condition.  Please if you smoke please consider quitting. [facility] Smoke cessation hotline number is [phone], available at this number is free counseling and medications to live a healthier life!         If you were prescribed a narcotic or controlled medication, do not drive or operate heavy equipment or machinery while taking these medications.

## 2024-06-06 NOTE — PATIENT INSTRUCTIONS
You must understand that you've received an Urgent Care treatment only and that you may be released before all your medical problems are known or treated. You, the patient, will arrange for follow up care as instructed.  Follow up with your PCP or specialty clinic as directed in the next 1-2 weeks if not improved or as needed.  You can call [phone] to schedule an appointment with the appropriate provider.  If your condition worsens we recommend that you receive another evaluation at the emergency room immediately or contact your primary medical clinics after hours call service to discuss your concerns.  Please return here or go to the Emergency Department for any concerns or worsening of condition.  Please if you smoke please consider quitting. [facility] Smoke cessation hotline number is [phone], available at this number is free counseling and medications to live a healthier life!         If you were prescribed a narcotic or controlled medication, do not drive or operate heavy equipment or machinery while taking these medications.

## 2024-06-10 NOTE — TELEPHONE ENCOUNTER
Okay to write letter. Titers were negative. Started repeat series. Will need three doses. Please provide them with dates of immunizations and date of titer. Also please provide the date the third dose is needed and then she can have titers repeated after that time. -Dr. DOZIER

## 2024-06-12 ENCOUNTER — LAB VISIT (OUTPATIENT)
Dept: LAB | Facility: HOSPITAL | Age: 24
End: 2024-06-12
Attending: NURSE PRACTITIONER
Payer: OTHER GOVERNMENT

## 2024-06-12 ENCOUNTER — OFFICE VISIT (OUTPATIENT)
Dept: FAMILY MEDICINE | Facility: CLINIC | Age: 24
End: 2024-06-12
Payer: OTHER GOVERNMENT

## 2024-06-12 VITALS
OXYGEN SATURATION: 100 % | HEART RATE: 76 BPM | RESPIRATION RATE: 12 BRPM | WEIGHT: 168.81 LBS | SYSTOLIC BLOOD PRESSURE: 102 MMHG | BODY MASS INDEX: 25 KG/M2 | HEIGHT: 69 IN | DIASTOLIC BLOOD PRESSURE: 60 MMHG

## 2024-06-12 DIAGNOSIS — Z01.84 IMMUNITY TO HEPATITIS B VIRUS DEMONSTRATED BY SEROLOGIC TEST: ICD-10-CM

## 2024-06-12 DIAGNOSIS — Z23 MENINGOCOCCAL GROUP B VACCINE ADMINISTERED: Primary | ICD-10-CM

## 2024-06-12 DIAGNOSIS — K59.00 CONSTIPATION, UNSPECIFIED CONSTIPATION TYPE: ICD-10-CM

## 2024-06-12 PROCEDURE — 36415 COLL VENOUS BLD VENIPUNCTURE: CPT | Performed by: NURSE PRACTITIONER

## 2024-06-12 PROCEDURE — 90471 IMMUNIZATION ADMIN: CPT | Mod: ,,, | Performed by: NURSE PRACTITIONER

## 2024-06-12 PROCEDURE — 99999PBSHW PR PBB SHADOW TECHNICAL ONLY FILED TO HB: Mod: PBBFAC,,,

## 2024-06-12 PROCEDURE — 86706 HEP B SURFACE ANTIBODY: CPT | Performed by: NURSE PRACTITIONER

## 2024-06-12 PROCEDURE — 99213 OFFICE O/P EST LOW 20 MIN: CPT | Mod: S$PBB,25,, | Performed by: NURSE PRACTITIONER

## 2024-06-12 PROCEDURE — 90620 MENB-4C VACCINE IM: CPT | Mod: PBBFAC | Performed by: NURSE PRACTITIONER

## 2024-06-12 PROCEDURE — 99999 PR PBB SHADOW E&M-EST. PATIENT-LVL IV: CPT | Mod: PBBFAC,,, | Performed by: NURSE PRACTITIONER

## 2024-06-12 PROCEDURE — 99214 OFFICE O/P EST MOD 30 MIN: CPT | Mod: PBBFAC | Performed by: NURSE PRACTITIONER

## 2024-06-12 RX ADMIN — NEISSERIA MENINGITIDIS SEROGROUP B NHBA FUSION PROTEIN ANTIGEN, NEISSERIA MENINGITIDIS SEROGROUP B FHBP FUSION PROTEIN ANTIGEN AND NEISSERIA MENINGITIDIS SEROGROUP B NADA PROTEIN ANTIGEN 0.5 ML: 50; 50; 50; 25 INJECTION, SUSPENSION INTRAMUSCULAR at 05:06

## 2024-06-12 NOTE — PROGRESS NOTES
"Subjective:       Patient ID: Annmarie Pennington is a 24 y.o. female.    Chief Complaint: Immunizations  -  The pt is a 23 y/o female that presents to complete her immunization series for [place] school in [place], expected to have all vaccines by 7/1/24.    Appropriate to given second meningococcal B. Hep B titer was <10 despite vaccinated in childhood. 1st repeat vac 3/28/24, 2nd 4/29/24. According to the CDC Healthcare Personal Vaccine Recommendations, Appendix A. Acceptable if titer < 10 to give a 2 or 3 dose series with repeat titer 1-2 mo after last vaccine. If still non-reactive, consider a "non-responder."   https://www.cdc.gov/vaccines/pubs/pinkbook/downloads/appendices/a/vaccination-recommendations.pdf  -  Past Medical History:   Diagnosis Date    Abnormal eye movements 07/11/2016    right eye    COVID-19     Headache     Lazy eye 07/11/2016    left    Orbital cellulitis on left 07/11/2016    orbital prolapse    Viral encephalitis     Viral encephalitis     age 6m       Past Surgical History:   Procedure Laterality Date    COLONOSCOPY N/A 5/22/2024    Procedure: COLONOSCOPY;  Surgeon: Madhu Shepherd MD;  Location: [facility];  Service: Gastroenterology;  Laterality: N/A;        Social History     Socioeconomic History    Marital status: Single   Tobacco Use    Smoking status: Former     Types: Vaping with nicotine     Passive exposure: Past    Smokeless tobacco: Never   Substance and Sexual Activity    Alcohol use: Yes     Comment: Socially; 4x month    Drug use: No    Sexual activity: Yes     Partners: Male     Birth control/protection: OCP   Social History Narrative    Lives with parents, 2 half sisters, that live with mother's.    1 dog, 2 cats.    Attends [employer]     Social Determinants of Health     Financial Resource Strain: Low Risk  (11/10/2023)    Overall Financial Resource Strain (CARDIA)     Difficulty of Paying Living Expenses: Not hard at all   Food Insecurity: No Food Insecurity " (11/10/2023)    Hunger Vital Sign     Worried About Running Out of Food in the Last Year: Never true     Ran Out of Food in the Last Year: Never true   Transportation Needs: No Transportation Needs (11/10/2023)    PRAPARE - Transportation     Lack of Transportation (Medical): No     Lack of Transportation (Non-Medical): No   Physical Activity: Insufficiently Active (11/10/2023)    Exercise Vital Sign     Days of Exercise per Week: 3 days     Minutes of Exercise per Session: 40 min   Stress: Stress Concern Present (11/10/2023)    [employer] [place] [employer] - Occupational Stress Questionnaire     Feeling of Stress : To some extent   Housing Stability: Low Risk  (11/10/2023)    Housing Stability Vital Sign     Unable to Pay for Housing in the Last Year: No     Number of Places Lived in the Last Year: 2     Unstable Housing in the Last Year: No       Family History   Problem Relation Name Age of Onset    Cancer Mother      Rosacea Mother      Hypertension Father      Hyperlipidemia Father      Breast cancer Maternal Grandmother      Diabetes Paternal Grandmother      Diabetes Paternal Grandfather      Colon cancer Neg Hx      Ovarian cancer Neg Hx      Esophageal cancer Neg Hx      Stomach cancer Neg Hx      Ulcerative colitis Neg Hx      Crohn's disease Neg Hx         Review of patient's allergies indicates:  No Known Allergies       Current Outpatient Medications:     drospirenone-ethinyl estradioL (RAFFI) 3-0.03 mg per tablet, Take 1 tablet by mouth once daily., Disp: 84 tablet, Rfl: 3    ferrous sulfate 325 (65 FE) MG EC tablet, 90 EA, 0 Refill(s), TAKE 1 TABLET BY MOUTH EVERY DAY, 0 Refill(s), Soft Stop, Disp: , Rfl:     linaCLOtide (LINZESS) 72 mcg Cap capsule, Take 1 capsule (72 mcg total) by mouth before breakfast., Disp: 90 capsule, Rfl: 1    vilazodone (VIIBRYD) 10 mg Tab tablet, Take 1 tablet (10 mg total) by mouth once daily. (Patient taking differently: Take 10 mg by mouth daily as needed. To  manage acute symptoms of anxiety and depression), Disp: 90 tablet, Rfl: 3  No current facility-administered medications for this visit.    HPI  Review of Systems   Constitutional: Negative.    HENT: Negative.     Eyes: Negative.    Respiratory: Negative.     Cardiovascular: Negative.    Gastrointestinal: Negative.    Endocrine: Negative.    Genitourinary: Negative.    Musculoskeletal: Negative.    Skin: Negative.    Allergic/Immunologic: Negative.    Neurological: Negative.    Hematological: Negative.    Psychiatric/Behavioral: Negative.         Objective:      Physical Exam  Vitals and nursing note reviewed.   Constitutional:       General: She is not in acute distress.     Appearance: Normal appearance. She is well-developed and normal weight. She is not ill-appearing.   HENT:      Head: Normocephalic.   Eyes:      Conjunctiva/sclera: Conjunctivae normal.   Neck:      Thyroid: No thyromegaly.   Cardiovascular:      Rate and Rhythm: Normal rate and regular rhythm.      Heart sounds: Normal heart sounds. No murmur heard.  Pulmonary:      Effort: Pulmonary effort is normal.      Breath sounds: Normal breath sounds. No wheezing or rales.   Musculoskeletal:         General: Normal range of motion.      Cervical back: Normal range of motion.      Right lower leg: No edema.      Left lower leg: No edema.   Skin:     General: Skin is warm and dry.   Neurological:      Mental Status: She is alert and oriented to person, place, and time. Mental status is at baseline.   Psychiatric:         Mood and Affect: Mood normal.         Behavior: Behavior normal.         Thought Content: Thought content normal.         Judgment: Judgment normal.         Assessment:       1. Meningococcal group B vaccine administered    2. Immunity to hepatitis B virus demonstrated by serologic test        Plan:     1- 2nd meningococcal B given  2- anti-HBs today  -    Meningococcal group B vaccine administered  -     VFC-meningococcal group B (PF)  (BEXSERO) vaccine 0.5 mL    Immunity to hepatitis B virus demonstrated by serologic test  -     Hepatitis B Surface Antibody, Qual/Quant; Future; Expected date: 06/12/2024        Risks, benefits, and side effects were discussed with the patient. All questions were answered to the fullest satisfaction of the patient, and pt verbalized understanding and agreement to treatment plan. Pt was to call with any new or worsening symptoms, or present to the ER.

## 2024-06-14 ENCOUNTER — PATIENT MESSAGE (OUTPATIENT)
Dept: FAMILY MEDICINE | Facility: CLINIC | Age: 24
End: 2024-06-14
Payer: OTHER GOVERNMENT

## 2024-06-14 DIAGNOSIS — K59.00 CONSTIPATION, UNSPECIFIED CONSTIPATION TYPE: ICD-10-CM

## 2024-06-15 LAB
HBV SURFACE AB SER QL IA: POSITIVE
HBV SURFACE AB SERPL IA-ACNC: NORMAL MIU/ML

## 2024-09-30 RX ORDER — RIZATRIPTAN BENZOATE 5 MG/1
TABLET ORAL
Qty: 30 TABLET | Refills: 5 | Status: SHIPPED | OUTPATIENT
Start: 2024-09-30

## 2024-09-30 NOTE — TELEPHONE ENCOUNTER
No care due was identified.  [facility] Embedded Care Due Messages. Reference number: [id number].   9/30/2024 7:36:31 AM CDT

## 2024-09-30 NOTE — TELEPHONE ENCOUNTER
Refill Routing Note   Medication(s) are not appropriate for processing by [facility] for the following reason(s):        No active prescription written by provider    [facility] action(s):  Defer             Appointments  past 12m or future 3m with PCP    Date Provider   Last Visit   Visit date not found Betsy Maxwell MD   Next Visit   Visit date not found Betsy Maxwell MD   ED visits in past 90 days: 0        Note composed:8:17 AM 09/30/2024

## 2025-08-20 ENCOUNTER — PATIENT MESSAGE (OUTPATIENT)
Dept: ADMINISTRATIVE | Facility: HOSPITAL | Age: 25
End: 2025-08-20